# Patient Record
Sex: FEMALE | Race: WHITE | Employment: OTHER | URBAN - METROPOLITAN AREA
[De-identification: names, ages, dates, MRNs, and addresses within clinical notes are randomized per-mention and may not be internally consistent; named-entity substitution may affect disease eponyms.]

---

## 2018-02-20 ENCOUNTER — OFFICE VISIT (OUTPATIENT)
Dept: INFECTIOUS DISEASES | Age: 73
End: 2018-02-20
Payer: MEDICARE

## 2018-02-20 VITALS
TEMPERATURE: 97.5 F | HEIGHT: 64 IN | HEART RATE: 67 BPM | DIASTOLIC BLOOD PRESSURE: 79 MMHG | WEIGHT: 199 LBS | SYSTOLIC BLOOD PRESSURE: 153 MMHG | RESPIRATION RATE: 14 BRPM | OXYGEN SATURATION: 98 % | BODY MASS INDEX: 33.97 KG/M2

## 2018-02-20 DIAGNOSIS — M31.9: Primary | ICD-10-CM

## 2018-02-20 DIAGNOSIS — L02.93 RECURRENT BOILS: ICD-10-CM

## 2018-02-20 PROCEDURE — 99213 OFFICE O/P EST LOW 20 MIN: CPT | Performed by: INTERNAL MEDICINE

## 2018-02-20 PROCEDURE — G8599 NO ASA/ANTIPLAT THER USE RNG: HCPCS | Performed by: INTERNAL MEDICINE

## 2018-02-20 PROCEDURE — 4040F PNEUMOC VAC/ADMIN/RCVD: CPT | Performed by: INTERNAL MEDICINE

## 2018-02-20 PROCEDURE — 3017F COLORECTAL CA SCREEN DOC REV: CPT | Performed by: INTERNAL MEDICINE

## 2018-02-20 PROCEDURE — 3014F SCREEN MAMMO DOC REV: CPT | Performed by: INTERNAL MEDICINE

## 2018-02-20 PROCEDURE — 1090F PRES/ABSN URINE INCON ASSESS: CPT | Performed by: INTERNAL MEDICINE

## 2018-02-20 PROCEDURE — G8417 CALC BMI ABV UP PARAM F/U: HCPCS | Performed by: INTERNAL MEDICINE

## 2018-02-20 PROCEDURE — G8427 DOCREV CUR MEDS BY ELIG CLIN: HCPCS | Performed by: INTERNAL MEDICINE

## 2018-02-20 PROCEDURE — G8400 PT W/DXA NO RESULTS DOC: HCPCS | Performed by: INTERNAL MEDICINE

## 2018-02-20 PROCEDURE — 1123F ACP DISCUSS/DSCN MKR DOCD: CPT | Performed by: INTERNAL MEDICINE

## 2018-02-20 PROCEDURE — G8484 FLU IMMUNIZE NO ADMIN: HCPCS | Performed by: INTERNAL MEDICINE

## 2018-02-20 PROCEDURE — 1036F TOBACCO NON-USER: CPT | Performed by: INTERNAL MEDICINE

## 2018-02-20 RX ORDER — IBUPROFEN 800 MG/1
800 TABLET ORAL EVERY 6 HOURS PRN
COMMUNITY

## 2018-02-20 ASSESSMENT — ENCOUNTER SYMPTOMS
DIARRHEA: 0
BACK PAIN: 0
NAUSEA: 0
SHORTNESS OF BREATH: 0
ABDOMINAL PAIN: 0
ABDOMINAL DISTENTION: 0
VOICE CHANGE: 0
TROUBLE SWALLOWING: 0
FACIAL SWELLING: 0
EYE DISCHARGE: 0
COUGH: 0
SORE THROAT: 0

## 2018-02-20 NOTE — PROGRESS NOTES
 Potassium 11/11/2016 4.4  3.7 - 5.3 mmol/L Final    Chloride 11/11/2016 101  98 - 107 mmol/L Final    CO2 11/11/2016 25  20 - 31 mmol/L Final    Anion Gap 11/11/2016 14  9 - 17 mmol/L Final    Alkaline Phosphatase 11/11/2016 113* 35 - 104 U/L Final    ALT 11/11/2016 67* 5 - 33 U/L Final    AST 11/11/2016 63* <32 U/L Final    Total Bilirubin 11/11/2016 1.17  0.3 - 1.2 mg/dL Final    Total Protein 11/11/2016 6.8  6.4 - 8.3 g/dL Final    Alb 11/11/2016 4.1  3.5 - 5.2 g/dL Final    Albumin/Globulin Ratio 11/11/2016 1.5  1.0 - 2.5 Final    GFR Non- 11/11/2016 >60  >60 mL/min Final    GFR  11/11/2016 >60  >60 mL/min Final    GFR Comment 11/11/2016        Final    Comment: Average GFR for 79or more years old:   76 mL/min/1.73sq m  Chronic Kidney Disease:   <60 mL/min/1.73sq m  Kidney failure:   <15 mL/min/1.73sq m        Performed at [address] [place] [address] [phone]      GFR Staging 11/11/2016 NOT REPORTED   Final    Cholesterol 11/11/2016 141  <200 mg/dL Final    Comment:    Cholesterol Guidelines:      <200  Desirable   200-240  Borderline      >240  Undesirable         HDL 11/11/2016 51  >40 mg/dL Final    Comment:    HDL Guidelines:    <40     Undesirable   40-59    Borderline    >59     Desirable         LDL Cholesterol 11/11/2016 74  0 - 130 mg/dL Final    Comment:    LDL Guidelines:     <100    Desirable   100-129   Near to/above Desirable   130-159   Borderline      >159   Undesirable     Direct (measured) LDL and calculated LDL are not interchangeable tests.  Chol/HDL Ratio 11/11/2016 2.8  <5 Final    Triglycerides 11/11/2016 81  <150 mg/dL Final    Comment:    Triglyceride Guidelines:     <150   Desirable   150-199  Borderline   200-499  High     >499   Very high   Based on AHA Guidelines for fasting triglyceride, October 2012.      Performed at [facility] [address] [facility], [address] [place] [address] [phone]      VLDL

## 2018-02-20 NOTE — LETTER
left labial wound           Data Review:      No visits with results within 2 Month(s) from this visit.    Latest known visit with results is:   Hospital Outpatient Visit on 11/11/2016   Component Date Value Ref Range Status    Glucose 11/11/2016 186* 70 - 99 mg/dL Final    BUN 11/11/2016 19  8 - 23 mg/dL Final    CREATININE 11/11/2016 0.73  0.50 - 0.90 mg/dL Final    Bun/Cre Ratio 11/11/2016 NOT REPORTED  9 - 20 Final    Calcium 11/11/2016 9.1  8.6 - 10.4 mg/dL Final    Sodium 11/11/2016 140  135 - 144 mmol/L Final    Potassium 11/11/2016 4.4  3.7 - 5.3 mmol/L Final    Chloride 11/11/2016 101  98 - 107 mmol/L Final    CO2 11/11/2016 25  20 - 31 mmol/L Final    Anion Gap 11/11/2016 14  9 - 17 mmol/L Final    Alkaline Phosphatase 11/11/2016 113* 35 - 104 U/L Final    ALT 11/11/2016 67* 5 - 33 U/L Final    AST 11/11/2016 63* <32 U/L Final    Total Bilirubin 11/11/2016 1.17  0.3 - 1.2 mg/dL Final    Total Protein 11/11/2016 6.8  6.4 - 8.3 g/dL Final    Alb 11/11/2016 4.1  3.5 - 5.2 g/dL Final    Albumin/Globulin Ratio 11/11/2016 1.5  1.0 - 2.5 Final    GFR Non- 11/11/2016 >60  >60 mL/min Final    GFR  11/11/2016 >60  >60 mL/min Final    GFR Comment 11/11/2016        Final    Comment: Average GFR for 79or more years old:   76 mL/min/1.73sq m  Chronic Kidney Disease:   <60 mL/min/1.73sq m  Kidney failure:   <15 mL/min/1.73sq m        Performed at [address], [address] [place] [address] [phone]      GFR Staging 11/11/2016 NOT REPORTED   Final    Cholesterol 11/11/2016 141  <200 mg/dL Final    Comment:    Cholesterol Guidelines:      <200  Desirable   200-240  Borderline      >240  Undesirable         HDL 11/11/2016 51  >40 mg/dL Final    Comment:    HDL Guidelines:    <40     Undesirable   40-59    Borderline    >59     Desirable         LDL Cholesterol 11/11/2016 74  0 - 130 mg/dL Final    Comment:    LDL Guidelines:     <100    Desirable 100-129   Near to/above Desirable   130-159   Borderline      >159   Undesirable     Direct (measured) LDL and calculated LDL are not interchangeable tests.  Chol/HDL Ratio 11/11/2016 2.8  <5 Final    Triglycerides 11/11/2016 81  <150 mg/dL Final    Comment:    Triglyceride Guidelines:     <150   Desirable   150-199  Borderline   200-499  High     >499   Very high   Based on AHA Guidelines for fasting triglyceride, October 2012. Performed at [facility], [address] [place] [address] [phone]      VLDL 11/11/2016 NOT REPORTED  1 - 30 mg/dL Final    Microalb, Ur 11/11/2016 <12  mg/L Final    Creatinine, Ur 11/11/2016 137.5  28.0 - 217.0 mg/dL Final    Microalb/Crt. Ratio 11/11/2016 9  <25 mcg/mg creat Final         MICRO:      IMAGING:        Problem List:  Patient Active Problem List   Diagnosis    Hyperlipidemia    Diabetes mellitus ([address]    Confusion caused by a drug ([address]    Intentional drug overdose ([address]    Coronary artery disease involving native coronary artery of native heart without angina pectoris    HTN (hypertension), benign    Environmental allergies    Suicidal deliberate poisoning ([address]    Severe recurrent major depression without psychotic features ([address]    Pressure ulcer of foot    Diabetes mellitus due to underlying condition with foot ulcer ([address]             Thank you. Anthony Fleming MD  2/20/2018  4:09 PM    Infectious Disease Medicine    If you have questions, please do not hesitate to call me. I look forward to following Marvin Mccall along with you.     Sincerely,    MD Anthony Wiseman MD

## 2018-10-11 LAB
AVERAGE GLUCOSE: NORMAL
HBA1C MFR BLD: 8.3 %

## 2018-11-22 ENCOUNTER — HOSPITAL ENCOUNTER (EMERGENCY)
Age: 73
Discharge: HOME | End: 2018-11-22
Attending: EMERGENCY MEDICINE
Payer: MEDICARE

## 2018-11-22 ENCOUNTER — APPOINTMENT (OUTPATIENT)
Dept: GENERAL RADIOLOGY | Age: 73
End: 2018-11-22
Payer: MEDICARE

## 2018-11-22 VITALS
WEIGHT: 209 LBS | BODY MASS INDEX: 35.68 KG/M2 | SYSTOLIC BLOOD PRESSURE: 162 MMHG | DIASTOLIC BLOOD PRESSURE: 68 MMHG | TEMPERATURE: 97.6 F | OXYGEN SATURATION: 97 % | HEART RATE: 50 BPM | HEIGHT: 64 IN | RESPIRATION RATE: 14 BRPM

## 2018-11-22 DIAGNOSIS — I10 ESSENTIAL HYPERTENSION: Primary | ICD-10-CM

## 2018-11-22 LAB
ABSOLUTE EOS #: 0.16 K/UL (ref 0–0.44)
ABSOLUTE IMMATURE GRANULOCYTE: <0.03 K/UL (ref 0–0.3)
ABSOLUTE LYMPH #: 1.81 K/UL (ref 1.1–3.7)
ABSOLUTE MONO #: 0.39 K/UL (ref 0.1–1.2)
ANION GAP SERPL CALCULATED.3IONS-SCNC: 8 MMOL/L (ref 9–17)
BASOPHILS # BLD: 1 % (ref 0–2)
BASOPHILS ABSOLUTE: 0.05 K/UL (ref 0–0.2)
BUN BLDV-MCNC: 19 MG/DL (ref 8–23)
BUN/CREAT BLD: ABNORMAL (ref 9–20)
CALCIUM SERPL-MCNC: 9.4 MG/DL (ref 8.6–10.4)
CHLORIDE BLD-SCNC: 103 MMOL/L (ref 98–107)
CO2: 24 MMOL/L (ref 20–31)
CREAT SERPL-MCNC: 0.6 MG/DL (ref 0.5–0.9)
DIFFERENTIAL TYPE: ABNORMAL
EKG ATRIAL RATE: 50 BPM
EKG P AXIS: 30 DEGREES
EKG P-R INTERVAL: 208 MS
EKG Q-T INTERVAL: 464 MS
EKG QRS DURATION: 94 MS
EKG QTC CALCULATION (BAZETT): 423 MS
EKG R AXIS: 10 DEGREES
EKG T AXIS: 51 DEGREES
EKG VENTRICULAR RATE: 50 BPM
EOSINOPHILS RELATIVE PERCENT: 3 % (ref 1–4)
GFR AFRICAN AMERICAN: >60 ML/MIN
GFR NON-AFRICAN AMERICAN: >60 ML/MIN
GFR SERPL CREATININE-BSD FRML MDRD: ABNORMAL ML/MIN/{1.73_M2}
GFR SERPL CREATININE-BSD FRML MDRD: ABNORMAL ML/MIN/{1.73_M2}
GLUCOSE BLD-MCNC: 201 MG/DL (ref 70–99)
HCT VFR BLD CALC: 36.7 % (ref 36.3–47.1)
HEMOGLOBIN: 11.2 G/DL (ref 11.9–15.1)
IMMATURE GRANULOCYTES: 0 %
LYMPHOCYTES # BLD: 31 % (ref 24–43)
MCH RBC QN AUTO: 23.4 PG (ref 25.2–33.5)
MCHC RBC AUTO-ENTMCNC: 30.5 G/DL (ref 28.4–34.8)
MCV RBC AUTO: 76.8 FL (ref 82.6–102.9)
MONOCYTES # BLD: 7 % (ref 3–12)
NRBC AUTOMATED: 0 PER 100 WBC
PDW BLD-RTO: 13.9 % (ref 11.8–14.4)
PLATELET # BLD: 206 K/UL (ref 138–453)
PLATELET ESTIMATE: ABNORMAL
PMV BLD AUTO: 10.8 FL (ref 8.1–13.5)
POC TROPONIN I: 0 NG/ML (ref 0–0.1)
POC TROPONIN I: 0 NG/ML (ref 0–0.1)
POC TROPONIN INTERP: NORMAL
POC TROPONIN INTERP: NORMAL
POTASSIUM SERPL-SCNC: 4.7 MMOL/L (ref 3.7–5.3)
RBC # BLD: 4.78 M/UL (ref 3.95–5.11)
RBC # BLD: ABNORMAL 10*6/UL
SEG NEUTROPHILS: 58 % (ref 36–65)
SEGMENTED NEUTROPHILS ABSOLUTE COUNT: 3.46 K/UL (ref 1.5–8.1)
SODIUM BLD-SCNC: 135 MMOL/L (ref 135–144)
WBC # BLD: 5.9 K/UL (ref 3.5–11.3)
WBC # BLD: ABNORMAL 10*3/UL

## 2018-11-22 PROCEDURE — 6370000000 HC RX 637 (ALT 250 FOR IP): Performed by: STUDENT IN AN ORGANIZED HEALTH CARE EDUCATION/TRAINING PROGRAM

## 2018-11-22 PROCEDURE — 80048 BASIC METABOLIC PNL TOTAL CA: CPT

## 2018-11-22 PROCEDURE — 99284 EMERGENCY DEPT VISIT MOD MDM: CPT

## 2018-11-22 PROCEDURE — 96374 THER/PROPH/DIAG INJ IV PUSH: CPT

## 2018-11-22 PROCEDURE — 93005 ELECTROCARDIOGRAM TRACING: CPT

## 2018-11-22 PROCEDURE — 84484 ASSAY OF TROPONIN QUANT: CPT

## 2018-11-22 PROCEDURE — 71046 X-RAY EXAM CHEST 2 VIEWS: CPT

## 2018-11-22 PROCEDURE — 85025 COMPLETE CBC W/AUTO DIFF WBC: CPT

## 2018-11-22 PROCEDURE — 6360000002 HC RX W HCPCS: Performed by: STUDENT IN AN ORGANIZED HEALTH CARE EDUCATION/TRAINING PROGRAM

## 2018-11-22 RX ORDER — ONDANSETRON 2 MG/ML
4 INJECTION INTRAMUSCULAR; INTRAVENOUS ONCE
Status: COMPLETED | OUTPATIENT
Start: 2018-11-22 | End: 2018-11-22

## 2018-11-22 RX ORDER — ASPIRIN 81 MG/1
324 TABLET, CHEWABLE ORAL ONCE
Status: COMPLETED | OUTPATIENT
Start: 2018-11-22 | End: 2018-11-22

## 2018-11-22 RX ADMIN — ASPIRIN 81 MG 324 MG: 81 TABLET ORAL at 09:27

## 2018-11-22 RX ADMIN — ONDANSETRON 4 MG: 2 INJECTION INTRAMUSCULAR; INTRAVENOUS at 09:27

## 2018-11-22 ASSESSMENT — HEART SCORE: ECG: 1

## 2018-11-22 ASSESSMENT — ENCOUNTER SYMPTOMS
COUGH: 0
CHEST TIGHTNESS: 0
BACK PAIN: 0
WHEEZING: 0
RHINORRHEA: 0
SHORTNESS OF BREATH: 0
EYE DISCHARGE: 0
ABDOMINAL DISTENTION: 0
VOMITING: 0
SORE THROAT: 0
PHOTOPHOBIA: 0
EYE PAIN: 0
NAUSEA: 1

## 2018-11-22 NOTE — ED NOTES
Pt to ER via LS 3 with c/o dizziness and nausea, pt was found to be hypertensive per report, pt's SBP > 200. Pt reports taking BP meds last night. Denies chest pain/SOB, headache, vomiting, fever/chills. Pt was given 2 SL Nitro PTA and 's. Pt arrives alert and oriented x 4, no acute distress noted, rr even and NL. Placed on full continuous monitor. Will continue to monitor.      Lucio Arauz RN  11/22/18 0623

## 2018-11-22 NOTE — ED NOTES
Bed: 15  Expected date:   Expected time:   Means of arrival:   Comments:  [address] [id number], [place], RN  11/22/18 9560

## 2018-11-22 NOTE — ED PROVIDER NOTES
[facility]     Emergency Department     Faculty Attestation    I performed a history and physical examination of the patient and discussed management with the resident. I have reviewed and agree with the residents findings including all diagnostic interpretations, and treatment plans as written at the time of my review. Any areas of disagreement are noted on the chart. I was personally present for the key portions of any procedures. I have documented in the chart those procedures where I was not present during the key portions. I agree with the chief complaint, past medical history, past surgical history, allergies, medications, social and family history as documented unless otherwise noted below. Documentation of the HPI, Physical Exam and Medical Decision Making performed by miladys is based on my personal performance of the HPI, PE and MDM. For Physician Assistant/ Nurse Practitioner cases/documentation I have personally evaluated this patient and have completed at least one if not all key elements of the E/M (history, physical exam, and MDM). Additional findings are as noted. Primary Care Physician: Flower Fuentes MD    History: This is a 68 y.o. female who presents to the Emergency Department with complaint of dizziness. The patient presents with her complaint of dizziness began this morning. Was worse with any type of movement. The last couple of minutes and has resolved. She denies any blurred vision or diplopia. She denies any numbness, tingling or weakness. Denies any chest pain or shortness of breath associated with this. Patient had noted that her blood pressure was elevated. Physical:   height is 5' 4\" (1.626 m) and weight is 209 lb (94.8 kg). Her oral temperature is 97.6 °F (36.4 °C). Her blood pressure is 161/82 (abnormal) and her pulse is 51. Her oxygen saturation is 100%.   Lungs are clear auscultation bilaterally, heart

## 2018-11-22 NOTE — ED PROVIDER NOTES
[facility] ED  eMERGENCY dEPARTMENT eNCOUnter  Resident    Pt Name: Prince Montilla  MRN: [id number]  [place] 1945  Date of evaluation: 11/22/2018  PCP:  Alfredo Lawton MD    CHIEF COMPLAINT       Chief Complaint   Patient presents with    Hypertension     hypertensive and dizziness. Per report SBP > 200. Pt reports taking BP meds last night. Denies chest pain/SOB, headache    Dizziness       HISTORY OF PRESENT ILLNESS     Prince Montilla is a 68 y.o. female past medical history diabetes hypertension hyperlipidemia prior MI ×2 without stents presents with sudden onset of nausea and chest discomfort with radiation to the left shoulder and associated dizziness. This started at rest when she was watching television all EMS EMS administered 0.8 mg of nitroglycerin which improved her blood pressure was previously in the 200s now in the 160s. No fever, chills, abdominal or back pain, lightheaded, dyspnea on exertion, emesis, diaphoresis, headache, slurred speech, ataxia, limb weakness, facial droop, hematuria. Denies tobacco, alcohol, drug use specifically cocaine. REVIEW OF SYSTEMS       Review of Systems   Constitutional: Negative for activity change and fever. HENT: Negative for congestion, ear discharge, rhinorrhea and sore throat. Eyes: Negative for photophobia, pain, discharge and visual disturbance. Respiratory: Negative for cough, chest tightness, shortness of breath and wheezing. Cardiovascular: Positive for chest pain. Gastrointestinal: Positive for nausea. Negative for abdominal distention and vomiting. Genitourinary: Negative for flank pain. Musculoskeletal: Negative for back pain, gait problem and neck pain. Skin: Negative for pallor and wound. PAST MEDICAL HISTORY      has a past medical history of Cellulitis; Diabetes mellitus ([address] Hyperlipidemia; and Myocardial infarct ([facility]     SURGICAL HISTORY        has a past surgical history that includes heard.  Bradycardia 51   Pulmonary/Chest: Effort normal and breath sounds normal. No respiratory distress. She has no wheezes. She has no rales. Abdominal: Soft. Bowel sounds are normal. She exhibits no distension. There is no tenderness. There is no rebound. Neurological: She is alert and oriented to person, place, and time. Normal Heel to [facility]. Normal Finger nose Finger. Normal Julienne. Appropriate giat, not wide based. CN II - XII intact. DTRs +2 bilateral Bicept, Triceps, Brachioradialias, Patellar, Achilies. Symmetric sensation to light and deep palpation bilaterally in upper and lower extremities. +5/5 strength to bilateral upper extremity flexion and extension as well as bilateral hip / leg / plantar / dorsi flexion of lower extremities. No focal or sensory motor deficits. No weakness upon cervical flexion to indicate critical spinal stenosis. Skin: No rash noted. No erythema. Nursing note and vitals reviewed. DIAGNOSTIC RESULTS     EKG: All EKG's are interpreted by the Emergency Department Physician whoeither signs or Co-signs this chart in the absence of a cardiologist.    Normal sinus rhythm normal axis ventricular rate of 50 no left atrial enlargement or hypertrophy use. Poor R-wave progression no ST changes. T-wave inversion in anterior leads V2 V3. Prolonged NE interval 208. Appropriate QRS QTC 90 4/4/20 respectively. Overall EKG is nondiagnostic    RADIOLOGY:   I directlyvisualized the following images and reviewed the radiologist interpretations:  XR CHEST STANDARD (2 VW)   Final Result   No focal airspace consolidation or pulmonary vascular congestion.                    LABS:  Labs Reviewed   BASIC METABOLIC PANEL - Abnormal; Notable for the following:        Result Value    Glucose 201 (*)     Anion Gap 8 (*)     All other components within normal limits   CBC WITH AUTO DIFFERENTIAL - Abnormal; Notable for the following:     Hemoglobin 11.2 (*)     MCV 76.8 (*)     MCH 23.4 DO  Resident  11/22/18 1149

## 2019-09-10 ENCOUNTER — ANESTHESIA (OUTPATIENT)
Dept: OPERATING ROOM | Age: 74
End: 2019-09-10
Payer: MEDICARE

## 2019-09-10 ENCOUNTER — HOSPITAL ENCOUNTER (OUTPATIENT)
Age: 74
Setting detail: SURGERY ADMIT
Discharge: HOME | End: 2019-09-10
Attending: SURGERY | Admitting: SURGERY
Payer: MEDICARE

## 2019-09-10 ENCOUNTER — ANESTHESIA EVENT (OUTPATIENT)
Dept: OPERATING ROOM | Age: 74
End: 2019-09-10
Payer: MEDICARE

## 2019-09-10 VITALS
SYSTOLIC BLOOD PRESSURE: 145 MMHG | RESPIRATION RATE: 15 BRPM | BODY MASS INDEX: 35 KG/M2 | TEMPERATURE: 97 F | HEART RATE: 64 BPM | HEIGHT: 64 IN | DIASTOLIC BLOOD PRESSURE: 57 MMHG | WEIGHT: 205 LBS | OXYGEN SATURATION: 95 %

## 2019-09-10 VITALS — SYSTOLIC BLOOD PRESSURE: 97 MMHG | DIASTOLIC BLOOD PRESSURE: 55 MMHG | TEMPERATURE: 97.3 F | OXYGEN SATURATION: 95 %

## 2019-09-10 DIAGNOSIS — L02.211 ABDOMINAL WALL ABSCESS: Primary | ICD-10-CM

## 2019-09-10 LAB
ABSOLUTE EOS #: 0.18 K/UL (ref 0–0.44)
ABSOLUTE IMMATURE GRANULOCYTE: 0.02 K/UL (ref 0–0.3)
ABSOLUTE LYMPH #: 1.55 K/UL (ref 1.1–3.7)
ABSOLUTE MONO #: 0.38 K/UL (ref 0.1–1.2)
ANION GAP SERPL CALCULATED.3IONS-SCNC: 10 MMOL/L (ref 9–17)
BASOPHILS # BLD: 1 % (ref 0–2)
BASOPHILS ABSOLUTE: 0.04 K/UL (ref 0–0.2)
BUN BLDV-MCNC: 24 MG/DL (ref 8–23)
BUN/CREAT BLD: 25 (ref 9–20)
CALCIUM SERPL-MCNC: 9.6 MG/DL (ref 8.6–10.4)
CHLORIDE BLD-SCNC: 103 MMOL/L (ref 98–107)
CO2: 25 MMOL/L (ref 20–31)
CREAT SERPL-MCNC: 0.96 MG/DL (ref 0.5–0.9)
DIFFERENTIAL TYPE: ABNORMAL
EOSINOPHILS RELATIVE PERCENT: 3 % (ref 1–4)
GFR AFRICAN AMERICAN: >60 ML/MIN
GFR NON-AFRICAN AMERICAN: 57 ML/MIN
GFR SERPL CREATININE-BSD FRML MDRD: ABNORMAL ML/MIN/{1.73_M2}
GFR SERPL CREATININE-BSD FRML MDRD: ABNORMAL ML/MIN/{1.73_M2}
GLUCOSE BLD-MCNC: 185 MG/DL (ref 65–105)
GLUCOSE BLD-MCNC: 194 MG/DL (ref 65–105)
GLUCOSE BLD-MCNC: 296 MG/DL (ref 70–99)
HCT VFR BLD CALC: 40.8 % (ref 36.3–47.1)
HEMOGLOBIN: 12.4 G/DL (ref 11.9–15.1)
IMMATURE GRANULOCYTES: 0 %
LYMPHOCYTES # BLD: 23 % (ref 24–43)
MCH RBC QN AUTO: 22.8 PG (ref 25.2–33.5)
MCHC RBC AUTO-ENTMCNC: 30.4 G/DL (ref 28.4–34.8)
MCV RBC AUTO: 74.9 FL (ref 82.6–102.9)
MONOCYTES # BLD: 6 % (ref 3–12)
NRBC AUTOMATED: 0 PER 100 WBC
PDW BLD-RTO: 14.2 % (ref 11.8–14.4)
PLATELET # BLD: 242 K/UL (ref 138–453)
PLATELET ESTIMATE: ABNORMAL
PMV BLD AUTO: 10 FL (ref 8.1–13.5)
POTASSIUM SERPL-SCNC: 4.6 MMOL/L (ref 3.7–5.3)
RBC # BLD: 5.45 M/UL (ref 3.95–5.11)
RBC # BLD: ABNORMAL 10*6/UL
SEG NEUTROPHILS: 67 % (ref 36–65)
SEGMENTED NEUTROPHILS ABSOLUTE COUNT: 4.64 K/UL (ref 1.5–8.1)
SODIUM BLD-SCNC: 138 MMOL/L (ref 135–144)
WBC # BLD: 6.8 K/UL (ref 3.5–11.3)
WBC # BLD: ABNORMAL 10*3/UL

## 2019-09-10 PROCEDURE — 7100000010 HC PHASE II RECOVERY - FIRST 15 MIN: Performed by: SURGERY

## 2019-09-10 PROCEDURE — 3700000000 HC ANESTHESIA ATTENDED CARE: Performed by: SURGERY

## 2019-09-10 PROCEDURE — 87075 CULTR BACTERIA EXCEPT BLOOD: CPT

## 2019-09-10 PROCEDURE — 6370000000 HC RX 637 (ALT 250 FOR IP): Performed by: NURSE ANESTHETIST, CERTIFIED REGISTERED

## 2019-09-10 PROCEDURE — 3600000002 HC SURGERY LEVEL 2 BASE: Performed by: SURGERY

## 2019-09-10 PROCEDURE — 87070 CULTURE OTHR SPECIMN AEROBIC: CPT

## 2019-09-10 PROCEDURE — 85025 COMPLETE CBC W/AUTO DIFF WBC: CPT

## 2019-09-10 PROCEDURE — 87186 SC STD MICRODIL/AGAR DIL: CPT

## 2019-09-10 PROCEDURE — 87205 SMEAR GRAM STAIN: CPT

## 2019-09-10 PROCEDURE — 80048 BASIC METABOLIC PNL TOTAL CA: CPT

## 2019-09-10 PROCEDURE — 87206 SMEAR FLUORESCENT/ACID STAI: CPT

## 2019-09-10 PROCEDURE — 2500000003 HC RX 250 WO HCPCS: Performed by: NURSE ANESTHETIST, CERTIFIED REGISTERED

## 2019-09-10 PROCEDURE — 6360000002 HC RX W HCPCS: Performed by: NURSE ANESTHETIST, CERTIFIED REGISTERED

## 2019-09-10 PROCEDURE — 7100000000 HC PACU RECOVERY - FIRST 15 MIN: Performed by: SURGERY

## 2019-09-10 PROCEDURE — 7100000011 HC PHASE II RECOVERY - ADDTL 15 MIN: Performed by: SURGERY

## 2019-09-10 PROCEDURE — 2580000003 HC RX 258: Performed by: ANESTHESIOLOGY

## 2019-09-10 PROCEDURE — 87102 FUNGUS ISOLATION CULTURE: CPT

## 2019-09-10 PROCEDURE — 3700000001 HC ADD 15 MINUTES (ANESTHESIA): Performed by: SURGERY

## 2019-09-10 PROCEDURE — 3600000012 HC SURGERY LEVEL 2 ADDTL 15MIN: Performed by: SURGERY

## 2019-09-10 PROCEDURE — 87077 CULTURE AEROBIC IDENTIFY: CPT

## 2019-09-10 PROCEDURE — 6370000000 HC RX 637 (ALT 250 FOR IP): Performed by: ANESTHESIOLOGY

## 2019-09-10 PROCEDURE — 86403 PARTICLE AGGLUT ANTBDY SCRN: CPT

## 2019-09-10 PROCEDURE — 7100000001 HC PACU RECOVERY - ADDTL 15 MIN: Performed by: SURGERY

## 2019-09-10 PROCEDURE — 2709999900 HC NON-CHARGEABLE SUPPLY: Performed by: SURGERY

## 2019-09-10 PROCEDURE — 82947 ASSAY GLUCOSE BLOOD QUANT: CPT

## 2019-09-10 RX ORDER — OXYCODONE HYDROCHLORIDE AND ACETAMINOPHEN 5; 325 MG/1; MG/1
1 TABLET ORAL PRN
Status: COMPLETED | OUTPATIENT
Start: 2019-09-10 | End: 2019-09-10

## 2019-09-10 RX ORDER — DEXAMETHASONE SODIUM PHOSPHATE 10 MG/ML
INJECTION INTRAMUSCULAR; INTRAVENOUS PRN
Status: DISCONTINUED | OUTPATIENT
Start: 2019-09-10 | End: 2019-09-10 | Stop reason: SDUPTHER

## 2019-09-10 RX ORDER — MONTELUKAST SODIUM 10 MG/1
10 TABLET ORAL NIGHTLY
COMMUNITY

## 2019-09-10 RX ORDER — INSULIN GLARGINE 100 [IU]/ML
22 INJECTION, SOLUTION SUBCUTANEOUS DAILY
COMMUNITY

## 2019-09-10 RX ORDER — HYDRALAZINE HYDROCHLORIDE 20 MG/ML
5 INJECTION INTRAMUSCULAR; INTRAVENOUS EVERY 10 MIN PRN
Status: DISCONTINUED | OUTPATIENT
Start: 2019-09-10 | End: 2019-09-10 | Stop reason: HOSPADM

## 2019-09-10 RX ORDER — PROPOFOL 10 MG/ML
INJECTION, EMULSION INTRAVENOUS PRN
Status: DISCONTINUED | OUTPATIENT
Start: 2019-09-10 | End: 2019-09-10 | Stop reason: SDUPTHER

## 2019-09-10 RX ORDER — PHENOL 1.4 %
1 AEROSOL, SPRAY (ML) MUCOUS MEMBRANE NIGHTLY
COMMUNITY

## 2019-09-10 RX ORDER — EPHEDRINE SULFATE/0.9% NACL/PF 50 MG/5 ML
SYRINGE (ML) INTRAVENOUS PRN
Status: DISCONTINUED | OUTPATIENT
Start: 2019-09-10 | End: 2019-09-10 | Stop reason: SDUPTHER

## 2019-09-10 RX ORDER — FENTANYL CITRATE 50 UG/ML
25 INJECTION, SOLUTION INTRAMUSCULAR; INTRAVENOUS EVERY 5 MIN PRN
Status: DISCONTINUED | OUTPATIENT
Start: 2019-09-10 | End: 2019-09-10 | Stop reason: HOSPADM

## 2019-09-10 RX ORDER — SOLIFENACIN SUCCINATE 5 MG/1
10 TABLET, FILM COATED ORAL DAILY
COMMUNITY

## 2019-09-10 RX ORDER — SODIUM CHLORIDE, SODIUM LACTATE, POTASSIUM CHLORIDE, CALCIUM CHLORIDE 600; 310; 30; 20 MG/100ML; MG/100ML; MG/100ML; MG/100ML
INJECTION, SOLUTION INTRAVENOUS CONTINUOUS
Status: DISCONTINUED | OUTPATIENT
Start: 2019-09-10 | End: 2019-09-10 | Stop reason: HOSPADM

## 2019-09-10 RX ORDER — MIDAZOLAM HYDROCHLORIDE 1 MG/ML
INJECTION INTRAMUSCULAR; INTRAVENOUS PRN
Status: DISCONTINUED | OUTPATIENT
Start: 2019-09-10 | End: 2019-09-10 | Stop reason: SDUPTHER

## 2019-09-10 RX ORDER — ONDANSETRON 2 MG/ML
INJECTION INTRAMUSCULAR; INTRAVENOUS PRN
Status: DISCONTINUED | OUTPATIENT
Start: 2019-09-10 | End: 2019-09-10 | Stop reason: SDUPTHER

## 2019-09-10 RX ORDER — INSULIN LISPRO 100 [IU]/ML
30 INJECTION, SUSPENSION SUBCUTANEOUS NIGHTLY
COMMUNITY

## 2019-09-10 RX ORDER — OXYCODONE HYDROCHLORIDE AND ACETAMINOPHEN 5; 325 MG/1; MG/1
2 TABLET ORAL PRN
Status: COMPLETED | OUTPATIENT
Start: 2019-09-10 | End: 2019-09-10

## 2019-09-10 RX ORDER — HYDROMORPHONE HCL 110MG/55ML
0.5 PATIENT CONTROLLED ANALGESIA SYRINGE INTRAVENOUS EVERY 5 MIN PRN
Status: DISCONTINUED | OUTPATIENT
Start: 2019-09-10 | End: 2019-09-10 | Stop reason: HOSPADM

## 2019-09-10 RX ORDER — SENNOSIDES 8.6 MG
650 CAPSULE ORAL EVERY 8 HOURS PRN
COMMUNITY

## 2019-09-10 RX ORDER — LABETALOL HYDROCHLORIDE 5 MG/ML
5 INJECTION, SOLUTION INTRAVENOUS EVERY 10 MIN PRN
Status: DISCONTINUED | OUTPATIENT
Start: 2019-09-10 | End: 2019-09-10 | Stop reason: HOSPADM

## 2019-09-10 RX ORDER — HYDROCODONE BITARTRATE AND ACETAMINOPHEN 5; 325 MG/1; MG/1
1 TABLET ORAL EVERY 6 HOURS PRN
Qty: 28 TABLET | Refills: 0 | Status: SHIPPED | OUTPATIENT
Start: 2019-09-10 | End: 2019-09-17

## 2019-09-10 RX ORDER — ONDANSETRON 2 MG/ML
4 INJECTION INTRAMUSCULAR; INTRAVENOUS
Status: DISCONTINUED | OUTPATIENT
Start: 2019-09-10 | End: 2019-09-10 | Stop reason: HOSPADM

## 2019-09-10 RX ORDER — LIDOCAINE HYDROCHLORIDE 20 MG/ML
INJECTION, SOLUTION EPIDURAL; INFILTRATION; INTRACAUDAL; PERINEURAL PRN
Status: DISCONTINUED | OUTPATIENT
Start: 2019-09-10 | End: 2019-09-10 | Stop reason: SDUPTHER

## 2019-09-10 RX ORDER — PROMETHAZINE HYDROCHLORIDE 25 MG/ML
6.25 INJECTION, SOLUTION INTRAMUSCULAR; INTRAVENOUS
Status: DISCONTINUED | OUTPATIENT
Start: 2019-09-10 | End: 2019-09-10 | Stop reason: HOSPADM

## 2019-09-10 RX ORDER — FENTANYL CITRATE 50 UG/ML
INJECTION, SOLUTION INTRAMUSCULAR; INTRAVENOUS PRN
Status: DISCONTINUED | OUTPATIENT
Start: 2019-09-10 | End: 2019-09-10 | Stop reason: SDUPTHER

## 2019-09-10 RX ORDER — MULTIVITAMIN
1 TABLET ORAL DAILY
COMMUNITY

## 2019-09-10 RX ADMIN — Medication 100 MCG: at 13:38

## 2019-09-10 RX ADMIN — SODIUM CHLORIDE, POTASSIUM CHLORIDE, SODIUM LACTATE AND CALCIUM CHLORIDE: 600; 310; 30; 20 INJECTION, SOLUTION INTRAVENOUS at 12:18

## 2019-09-10 RX ADMIN — SODIUM CHLORIDE, POTASSIUM CHLORIDE, SODIUM LACTATE AND CALCIUM CHLORIDE: 600; 310; 30; 20 INJECTION, SOLUTION INTRAVENOUS at 13:33

## 2019-09-10 RX ADMIN — INSULIN LISPRO 6 UNITS: 100 INJECTION, SOLUTION INTRAVENOUS; SUBCUTANEOUS at 14:01

## 2019-09-10 RX ADMIN — PROPOFOL 200 MG: 10 INJECTION, EMULSION INTRAVENOUS at 13:38

## 2019-09-10 RX ADMIN — DEXAMETHASONE SODIUM PHOSPHATE 5 MG: 10 INJECTION INTRAMUSCULAR; INTRAVENOUS at 13:51

## 2019-09-10 RX ADMIN — Medication 10 MG: at 13:46

## 2019-09-10 RX ADMIN — Medication 10 MG: at 13:50

## 2019-09-10 RX ADMIN — MIDAZOLAM 1 MG: 1 INJECTION INTRAMUSCULAR; INTRAVENOUS at 13:35

## 2019-09-10 RX ADMIN — OXYCODONE AND ACETAMINOPHEN 1 TABLET: 5; 325 TABLET ORAL at 15:52

## 2019-09-10 RX ADMIN — LIDOCAINE HYDROCHLORIDE 100 MG: 20 INJECTION, SOLUTION EPIDURAL; INFILTRATION; INTRACAUDAL; PERINEURAL at 13:38

## 2019-09-10 RX ADMIN — ONDANSETRON 4 MG: 2 INJECTION, SOLUTION INTRAMUSCULAR; INTRAVENOUS at 14:02

## 2019-09-10 ASSESSMENT — PULMONARY FUNCTION TESTS
PIF_VALUE: 2
PIF_VALUE: 11
PIF_VALUE: 19
PIF_VALUE: 17
PIF_VALUE: 1
PIF_VALUE: 3
PIF_VALUE: 10
PIF_VALUE: 1
PIF_VALUE: 2
PIF_VALUE: 20
PIF_VALUE: 17
PIF_VALUE: 18
PIF_VALUE: 17
PIF_VALUE: 21
PIF_VALUE: 11
PIF_VALUE: 17
PIF_VALUE: 18
PIF_VALUE: 19
PIF_VALUE: 18
PIF_VALUE: 16
PIF_VALUE: 18
PIF_VALUE: 11
PIF_VALUE: 1
PIF_VALUE: 17
PIF_VALUE: 0
PIF_VALUE: 19
PIF_VALUE: 17
PIF_VALUE: 1
PIF_VALUE: 18
PIF_VALUE: 17
PIF_VALUE: 1
PIF_VALUE: 3
PIF_VALUE: 1

## 2019-09-10 ASSESSMENT — PAIN DESCRIPTION - ORIENTATION: ORIENTATION: LEFT;LOWER

## 2019-09-10 ASSESSMENT — PAIN - FUNCTIONAL ASSESSMENT: PAIN_FUNCTIONAL_ASSESSMENT: 0-10

## 2019-09-10 ASSESSMENT — PAIN SCALES - GENERAL
PAINLEVEL_OUTOF10: 4
PAINLEVEL_OUTOF10: 0
PAINLEVEL_OUTOF10: 4
PAINLEVEL_OUTOF10: 0

## 2019-09-10 ASSESSMENT — PAIN DESCRIPTION - DESCRIPTORS
DESCRIPTORS: SORE
DESCRIPTORS: SORE

## 2019-09-10 ASSESSMENT — PAIN DESCRIPTION - LOCATION: LOCATION: ABDOMEN

## 2019-09-10 NOTE — H&P
time with normal affect  Head:  normocephalic, atraumatic, face symmetrical .  Eye: no icterus, redness, pupils equal and reactive, extraocular eye movements intact, conjunctiva clear  Ear: normal external ear, no discharge, hearing intact  Nose:  no drainage noted  Mouth: mucous membranes moist Dentition good  Neck: supple, no carotid bruits, thyroid not palpable  Lungs: Bilateral equal air entry, clear to ausculation, no wheezing, rales or rhonchi, normal effort  Cardiovascular: Heart sounds distant, normal rate, regular rhythm, no murmur, gallop, rub. Abdomen: Soft,tenderness LLQ over abdominal dressing, nondistended, normal bowel sounds, no hepatomegaly or splenomegaly  Neurologic: There are no new focal motor or sensory deficits, normal muscle tone and bulk, no abnormal sensation, normal speech, cranial nerves II through XII grossly intact   Skin: Lesion LLQ abdomen with soiled dressing Lesion right thigh/groin covered with large bandaid  Refer to Dr Kelly Ho notes. No gross lesions, rashes, bruising or bleeding on exposed skin area  Extremities:  peripheral pulses palpable, no pedal edema or calf pain with palpation  Psych: normal affect     Investigations:      Laboratory Testing:  Recent Results (from the past 24 hour(s))   CBC Auto Differential    Collection Time: 09/10/19 12:10 PM   Result Value Ref Range    WBC 6.8 3.5 - 11.3 k/uL    RBC 5.45 (H) 3.95 - 5.11 m/uL    Hemoglobin 12.4 11.9 - 15.1 g/dL    Hematocrit 40.8 36.3 - 47.1 %    MCV 74.9 (L) 82.6 - 102.9 fL    MCH 22.8 (L) 25.2 - 33.5 pg    MCHC 30.4 28.4 - 34.8 g/dL    RDW 14.2 11.8 - 14.4 %    Platelets 836 809 - 606 k/uL    MPV 10.0 8.1 - 13.5 fL    NRBC Automated 0.0 0.0 per 100 WBC    Differential Type NOT REPORTED     Seg Neutrophils 67 (H) 36 - 65 %    Lymphocytes 23 (L) 24 - 43 %    Monocytes 6 3 - 12 %    Eosinophils % 3 1 - 4 %    Basophils 1 0 - 2 %    Immature Granulocytes 0 0 %    Segs Absolute 4.64 1.50 - 8.10 k/uL    Absolute Lymph # 1. 55 1.10 - 3.70 k/uL    Absolute Mono # 0.38 0.10 - 1.20 k/uL    Absolute Eos # 0.18 0.00 - 0.44 k/uL    Basophils Absolute 0.04 0.00 - 0.20 k/uL    Absolute Immature Granulocyte 0.02 0.00 - 0.30 k/uL    WBC Morphology NOT REPORTED     RBC Morphology MICROCYTOSIS PRESENT     Platelet Estimate NOT REPORTED    Basic Metabolic Panel    Collection Time: 09/10/19 12:10 PM   Result Value Ref Range    Glucose 296 (H) 70 - 99 mg/dL    BUN 24 (H) 8 - 23 mg/dL    CREATININE 0.96 (H) 0.50 - 0.90 mg/dL    Bun/Cre Ratio 25 (H) 9 - 20    Calcium 9.6 8.6 - 10.4 mg/dL    Sodium 138 135 - 144 mmol/L    Potassium 4.6 3.7 - 5.3 mmol/L    Chloride 103 98 - 107 mmol/L    CO2 25 20 - 31 mmol/L    Anion Gap 10 9 - 17 mmol/L    GFR Non-African American 57 (L) >60 mL/min    GFR African American >60 >60 mL/min    GFR Comment          GFR Staging NOT REPORTED    POC Glucose Fingerstick    Collection Time: 09/10/19 12:11 PM   Result Value Ref Range    POC Glucose 185 (H) 65 - 105 mg/dL       Recent Labs     09/10/19  1210   HGB 12.4   HCT 40.8   WBC 6.8   MCV 74.9*      K 4.6      CO2 25   BUN 24*   CREATININE 0.96*   GLUCOSE 296*       Imaging/Diagnostics:      Diagnosis:      1.  Left abdominal wall abscess and right upper thigh abscess    Plans:     1. I&D abdominal wall abscess with debridement, right thigh abscess       MEHDI Chapman CNP  9/10/2019  12:39 PM

## 2019-09-10 NOTE — DISCHARGE INSTR - DIET
 Good nutrition is important when healing from an illness, injury, or surgery. Follow any nutrition recommendations given to you during your hospital stay.  If you were given an oral nutrition supplement while in the hospital, continue to take this supplement at home. You can take it with meals, in-between meals, and/or before bedtime. These supplements can be purchased at most local grocery stores, pharmacies, and chain super-stores.  If you have any questions about your diet or nutrition, call the hospital and ask for the dietitian.     Low-fat diabetic diet  Drink plenty of fluids  Take Senokot-S or Dulcolax as needed until you have a bowel movement

## 2019-09-11 LAB
DIRECT EXAM: NORMAL
Lab: NORMAL
SPECIMEN DESCRIPTION: NORMAL

## 2019-09-13 LAB
CULTURE: ABNORMAL
DIRECT EXAM: ABNORMAL
DIRECT EXAM: ABNORMAL
Lab: ABNORMAL
SPECIMEN DESCRIPTION: ABNORMAL

## 2019-10-14 LAB
CULTURE: NORMAL
Lab: NORMAL
SPECIMEN DESCRIPTION: NORMAL

## 2020-10-20 ENCOUNTER — HOSPITAL ENCOUNTER (OUTPATIENT)
Age: 75
Discharge: HOME | End: 2020-10-20
Payer: MEDICARE

## 2020-10-20 LAB
ALBUMIN SERPL-MCNC: 4.3 G/DL (ref 3.5–5.2)
ALBUMIN/GLOBULIN RATIO: 1.5 (ref 1–2.5)
ALP BLD-CCNC: 119 U/L (ref 35–104)
ALT SERPL-CCNC: 17 U/L (ref 5–33)
ANION GAP SERPL CALCULATED.3IONS-SCNC: 13 MMOL/L (ref 9–17)
AST SERPL-CCNC: 24 U/L
BILIRUB SERPL-MCNC: 1.02 MG/DL (ref 0.3–1.2)
BUN BLDV-MCNC: 21 MG/DL (ref 8–23)
BUN/CREAT BLD: ABNORMAL (ref 9–20)
CALCIUM SERPL-MCNC: 9.5 MG/DL (ref 8.6–10.4)
CHLORIDE BLD-SCNC: 102 MMOL/L (ref 98–107)
CHOLESTEROL, FASTING: 125 MG/DL
CHOLESTEROL/HDL RATIO: 3
CO2: 24 MMOL/L (ref 20–31)
CREAT SERPL-MCNC: 0.9 MG/DL (ref 0.5–0.9)
CREATININE URINE: 215.2 MG/DL (ref 28–217)
ESTIMATED AVERAGE GLUCOSE: 398 MG/DL
GFR AFRICAN AMERICAN: >60 ML/MIN
GFR NON-AFRICAN AMERICAN: >60 ML/MIN
GFR SERPL CREATININE-BSD FRML MDRD: ABNORMAL ML/MIN/{1.73_M2}
GFR SERPL CREATININE-BSD FRML MDRD: ABNORMAL ML/MIN/{1.73_M2}
GLUCOSE BLD-MCNC: 279 MG/DL (ref 70–99)
HBA1C MFR BLD: 15.5 % (ref 4–6)
HDLC SERPL-MCNC: 41 MG/DL
LDL CHOLESTEROL: 63 MG/DL (ref 0–130)
MICROALBUMIN/CREAT 24H UR: 95 MG/L
MICROALBUMIN/CREAT UR-RTO: 44 MCG/MG CREAT
POTASSIUM SERPL-SCNC: 4.6 MMOL/L (ref 3.7–5.3)
SODIUM BLD-SCNC: 139 MMOL/L (ref 135–144)
TOTAL PROTEIN: 7.2 G/DL (ref 6.4–8.3)
TRIGLYCERIDE, FASTING: 105 MG/DL
VLDLC SERPL CALC-MCNC: NORMAL MG/DL (ref 1–30)

## 2020-10-20 PROCEDURE — 80061 LIPID PANEL: CPT

## 2020-10-20 PROCEDURE — 82043 UR ALBUMIN QUANTITATIVE: CPT

## 2020-10-20 PROCEDURE — 36415 COLL VENOUS BLD VENIPUNCTURE: CPT

## 2020-10-20 PROCEDURE — 82570 ASSAY OF URINE CREATININE: CPT

## 2020-10-20 PROCEDURE — 80053 COMPREHEN METABOLIC PANEL: CPT

## 2020-10-20 PROCEDURE — 83036 HEMOGLOBIN GLYCOSYLATED A1C: CPT

## 2020-11-06 ENCOUNTER — HOSPITAL ENCOUNTER (OUTPATIENT)
Dept: MAMMOGRAPHY | Age: 75
Discharge: HOME | End: 2020-11-08
Payer: MEDICARE

## 2020-11-06 PROCEDURE — 77080 DXA BONE DENSITY AXIAL: CPT

## 2021-07-02 ENCOUNTER — HOSPITAL ENCOUNTER (OUTPATIENT)
Age: 76
Discharge: HOME | End: 2021-07-02
Payer: MEDICARE

## 2021-07-02 LAB
ESTIMATED AVERAGE GLUCOSE: 226 MG/DL
HBA1C MFR BLD: 9.5 % (ref 4–6)

## 2021-07-02 PROCEDURE — 83036 HEMOGLOBIN GLYCOSYLATED A1C: CPT

## 2021-07-02 PROCEDURE — 36415 COLL VENOUS BLD VENIPUNCTURE: CPT

## 2022-08-16 ENCOUNTER — HOSPITAL ENCOUNTER (OUTPATIENT)
Age: 77
Setting detail: SPECIMEN
Discharge: HOME | End: 2022-08-16

## 2022-08-16 LAB
ALBUMIN SERPL-MCNC: 4.3 G/DL (ref 3.5–5.2)
ALBUMIN/GLOBULIN RATIO: 1.6 (ref 1–2.5)
ALP BLD-CCNC: 97 U/L (ref 35–104)
ALT SERPL-CCNC: 12 U/L (ref 5–33)
ANION GAP SERPL CALCULATED.3IONS-SCNC: 13 MMOL/L (ref 9–17)
AST SERPL-CCNC: 17 U/L
BILIRUB SERPL-MCNC: 1.39 MG/DL (ref 0.3–1.2)
BILIRUBIN URINE: NEGATIVE
BUN BLDV-MCNC: 16 MG/DL (ref 8–23)
CALCIUM SERPL-MCNC: 9.4 MG/DL (ref 8.6–10.4)
CASTS UA: NORMAL /LPF (ref 0–2)
CASTS UA: NORMAL /LPF (ref 0–2)
CHLORIDE BLD-SCNC: 103 MMOL/L (ref 98–107)
CHOLESTEROL/HDL RATIO: 4.9
CHOLESTEROL: 175 MG/DL
CO2: 23 MMOL/L (ref 20–31)
COLOR: ABNORMAL
CREAT SERPL-MCNC: 0.99 MG/DL (ref 0.5–0.9)
CREATININE URINE: 371.6 MG/DL (ref 28–217)
EPITHELIAL CELLS UA: NORMAL /HPF (ref 0–5)
ESTIMATED AVERAGE GLUCOSE: 349 MG/DL
GFR AFRICAN AMERICAN: >60 ML/MIN
GFR NON-AFRICAN AMERICAN: 55 ML/MIN
GFR SERPL CREATININE-BSD FRML MDRD: ABNORMAL ML/MIN/{1.73_M2}
GLUCOSE BLD-MCNC: 296 MG/DL (ref 70–99)
GLUCOSE URINE: ABNORMAL
HBA1C MFR BLD: 13.8 % (ref 4–6)
HDLC SERPL-MCNC: 36 MG/DL
KETONES, URINE: ABNORMAL
LDL CHOLESTEROL: 87 MG/DL (ref 0–130)
LEUKOCYTE ESTERASE, URINE: ABNORMAL
MICROALBUMIN/CREAT 24H UR: 443 MG/L
MICROALBUMIN/CREAT UR-RTO: 119 MCG/MG CREAT
NITRITE, URINE: NEGATIVE
PH UA: 5 (ref 5–8)
POTASSIUM SERPL-SCNC: 4.4 MMOL/L (ref 3.7–5.3)
PROTEIN UA: ABNORMAL
RBC UA: NORMAL /HPF (ref 0–2)
SODIUM BLD-SCNC: 139 MMOL/L (ref 135–144)
SPECIFIC GRAVITY UA: 1.04 (ref 1–1.03)
TOTAL PROTEIN: 7 G/DL (ref 6.4–8.3)
TRIGL SERPL-MCNC: 260 MG/DL
TSH SERPL DL<=0.05 MIU/L-ACNC: 1.51 UIU/ML (ref 0.3–5)
TURBIDITY: ABNORMAL
URINE HGB: NEGATIVE
UROBILINOGEN, URINE: NORMAL
WBC UA: NORMAL /HPF (ref 0–5)

## 2022-08-18 LAB
CULTURE: ABNORMAL
SPECIMEN DESCRIPTION: ABNORMAL

## 2022-08-31 ENCOUNTER — HOSPITAL ENCOUNTER (EMERGENCY)
Age: 77
Discharge: HOME | End: 2022-08-31
Attending: EMERGENCY MEDICINE
Payer: MEDICARE

## 2022-08-31 VITALS
RESPIRATION RATE: 18 BRPM | SYSTOLIC BLOOD PRESSURE: 120 MMHG | BODY MASS INDEX: 34.33 KG/M2 | DIASTOLIC BLOOD PRESSURE: 66 MMHG | WEIGHT: 200 LBS | TEMPERATURE: 97.7 F | OXYGEN SATURATION: 99 % | HEART RATE: 80 BPM

## 2022-08-31 DIAGNOSIS — R45.851 SUICIDAL IDEATION: Primary | ICD-10-CM

## 2022-08-31 LAB
ABSOLUTE EOS #: 0.16 K/UL (ref 0–0.44)
ABSOLUTE IMMATURE GRANULOCYTE: <0.03 K/UL (ref 0–0.3)
ABSOLUTE LYMPH #: 1.39 K/UL (ref 1.1–3.7)
ABSOLUTE MONO #: 0.36 K/UL (ref 0.1–1.2)
ACETAMINOPHEN LEVEL: <5 UG/ML (ref 10–30)
ALBUMIN SERPL-MCNC: 4 G/DL (ref 3.5–5.2)
ALBUMIN/GLOBULIN RATIO: 1.4 (ref 1–2.5)
ALP BLD-CCNC: 82 U/L (ref 35–104)
ALT SERPL-CCNC: 10 U/L (ref 5–33)
AMPHETAMINE SCREEN URINE: NEGATIVE
ANION GAP SERPL CALCULATED.3IONS-SCNC: 11 MMOL/L (ref 9–17)
AST SERPL-CCNC: 16 U/L
BARBITURATE SCREEN URINE: NEGATIVE
BASOPHILS # BLD: 0 % (ref 0–2)
BASOPHILS ABSOLUTE: <0.03 K/UL (ref 0–0.2)
BENZODIAZEPINE SCREEN, URINE: NEGATIVE
BILIRUB SERPL-MCNC: 1.02 MG/DL (ref 0.3–1.2)
BILIRUBIN URINE: NEGATIVE
BUN BLDV-MCNC: 18 MG/DL (ref 8–23)
CALCIUM SERPL-MCNC: 9.3 MG/DL (ref 8.6–10.4)
CANNABINOID SCREEN URINE: NEGATIVE
CASTS UA: NORMAL /LPF (ref 0–8)
CHLORIDE BLD-SCNC: 99 MMOL/L (ref 98–107)
CHP ED QC CHECK: NORMAL
CO2: 25 MMOL/L (ref 20–31)
COCAINE METABOLITE, URINE: NEGATIVE
COLOR: YELLOW
CREAT SERPL-MCNC: 0.92 MG/DL (ref 0.5–0.9)
EOSINOPHILS RELATIVE PERCENT: 2 % (ref 1–4)
EPITHELIAL CELLS UA: NORMAL /HPF (ref 0–5)
ETHANOL PERCENT: <0.01 %
ETHANOL: <10 MG/DL
FENTANYL URINE: NEGATIVE
GFR AFRICAN AMERICAN: >60 ML/MIN
GFR NON-AFRICAN AMERICAN: 59 ML/MIN
GFR SERPL CREATININE-BSD FRML MDRD: ABNORMAL ML/MIN/{1.73_M2}
GLUCOSE BLD-MCNC: 253 MG/DL
GLUCOSE BLD-MCNC: 253 MG/DL (ref 65–105)
GLUCOSE BLD-MCNC: 255 MG/DL (ref 65–105)
GLUCOSE BLD-MCNC: 267 MG/DL (ref 70–99)
GLUCOSE URINE: ABNORMAL
HCT VFR BLD CALC: 34.8 % (ref 36.3–47.1)
HEMOGLOBIN: 10.7 G/DL (ref 11.9–15.1)
IMMATURE GRANULOCYTES: 0 %
KETONES, URINE: ABNORMAL
LEUKOCYTE ESTERASE, URINE: ABNORMAL
LYMPHOCYTES # BLD: 20 % (ref 24–43)
MCH RBC QN AUTO: 22.9 PG (ref 25.2–33.5)
MCHC RBC AUTO-ENTMCNC: 30.7 G/DL (ref 28.4–34.8)
MCV RBC AUTO: 74.5 FL (ref 82.6–102.9)
METHADONE SCREEN, URINE: NEGATIVE
MONOCYTES # BLD: 5 % (ref 3–12)
NITRITE, URINE: NEGATIVE
NRBC AUTOMATED: 0 PER 100 WBC
OPIATES, URINE: NEGATIVE
OXYCODONE SCREEN URINE: NEGATIVE
PDW BLD-RTO: 14.5 % (ref 11.8–14.4)
PH UA: 5.5 (ref 5–8)
PHENCYCLIDINE, URINE: NEGATIVE
PLATELET # BLD: 206 K/UL (ref 138–453)
PMV BLD AUTO: 9.8 FL (ref 8.1–13.5)
POTASSIUM SERPL-SCNC: 3.7 MMOL/L (ref 3.7–5.3)
PROTEIN UA: ABNORMAL
RBC # BLD: 4.67 M/UL (ref 3.95–5.11)
RBC # BLD: ABNORMAL 10*6/UL
RBC UA: NORMAL /HPF (ref 0–4)
SALICYLATE LEVEL: <1 MG/DL (ref 3–10)
SARS-COV-2, RAPID: NOT DETECTED
SEG NEUTROPHILS: 73 % (ref 36–65)
SEGMENTED NEUTROPHILS ABSOLUTE COUNT: 4.92 K/UL (ref 1.5–8.1)
SODIUM BLD-SCNC: 135 MMOL/L (ref 135–144)
SPECIFIC GRAVITY UA: 1.03 (ref 1–1.03)
SPECIMEN DESCRIPTION: NORMAL
TEST INFORMATION: NORMAL
TOTAL PROTEIN: 6.9 G/DL (ref 6.4–8.3)
TOXIC TRICYCLIC SC,BLOOD: NEGATIVE
TURBIDITY: ABNORMAL
URINE HGB: ABNORMAL
UROBILINOGEN, URINE: NORMAL
WBC # BLD: 6.9 K/UL (ref 3.5–11.3)
WBC UA: NORMAL /HPF (ref 0–5)

## 2022-08-31 PROCEDURE — 82947 ASSAY GLUCOSE BLOOD QUANT: CPT

## 2022-08-31 PROCEDURE — 87635 SARS-COV-2 COVID-19 AMP PRB: CPT

## 2022-08-31 PROCEDURE — 80179 DRUG ASSAY SALICYLATE: CPT

## 2022-08-31 PROCEDURE — 80143 DRUG ASSAY ACETAMINOPHEN: CPT

## 2022-08-31 PROCEDURE — 81001 URINALYSIS AUTO W/SCOPE: CPT

## 2022-08-31 PROCEDURE — 80053 COMPREHEN METABOLIC PANEL: CPT

## 2022-08-31 PROCEDURE — 99285 EMERGENCY DEPT VISIT HI MDM: CPT

## 2022-08-31 PROCEDURE — G0480 DRUG TEST DEF 1-7 CLASSES: HCPCS

## 2022-08-31 PROCEDURE — 6370000000 HC RX 637 (ALT 250 FOR IP)

## 2022-08-31 PROCEDURE — 80307 DRUG TEST PRSMV CHEM ANLYZR: CPT

## 2022-08-31 PROCEDURE — 96372 THER/PROPH/DIAG INJ SC/IM: CPT

## 2022-08-31 PROCEDURE — 85025 COMPLETE CBC W/AUTO DIFF WBC: CPT

## 2022-08-31 PROCEDURE — 93005 ELECTROCARDIOGRAM TRACING: CPT

## 2022-08-31 RX ORDER — INSULIN LISPRO 100 [IU]/ML
0-4 INJECTION, SOLUTION INTRAVENOUS; SUBCUTANEOUS
Status: DISCONTINUED | OUTPATIENT
Start: 2022-08-31 | End: 2022-08-31 | Stop reason: HOSPADM

## 2022-08-31 RX ORDER — INSULIN LISPRO 100 [IU]/ML
0-4 INJECTION, SOLUTION INTRAVENOUS; SUBCUTANEOUS NIGHTLY
Status: DISCONTINUED | OUTPATIENT
Start: 2022-08-31 | End: 2022-08-31 | Stop reason: HOSPADM

## 2022-08-31 RX ORDER — CEPHALEXIN 500 MG/1
500 CAPSULE ORAL 2 TIMES DAILY
Qty: 14 CAPSULE | Refills: 0 | Status: SHIPPED | OUTPATIENT
Start: 2022-08-31 | End: 2022-09-07

## 2022-08-31 RX ADMIN — INSULIN LISPRO 2 UNITS: 100 INJECTION, SOLUTION INTRAVENOUS; SUBCUTANEOUS at 17:33

## 2022-08-31 ASSESSMENT — PAIN SCALES - GENERAL: PAINLEVEL_OUTOF10: 2

## 2022-08-31 ASSESSMENT — PAIN DESCRIPTION - LOCATION: LOCATION: SHOULDER

## 2022-08-31 ASSESSMENT — ENCOUNTER SYMPTOMS
COUGH: 0
ABDOMINAL PAIN: 0
SHORTNESS OF BREATH: 0

## 2022-08-31 ASSESSMENT — PAIN - FUNCTIONAL ASSESSMENT: PAIN_FUNCTIONAL_ASSESSMENT: 0-10

## 2022-08-31 NOTE — ED NOTES
Patient arrives to the ER by TPSARAH who has completed a Pink Slip on patient. [employer] states that patient's daughter called them because her mother locked herself in the bathroom with a knife and would not open the door. The [employer] officer stated it took them 20-minutes to get the knife away from patient. Patient admits to a history of depression and suicide attempts by overdose. Patient states she is not taking any meds for depression or seeing a counselor/psychiatrist currently. Patient tells SW that she \"doesn't want to be here, I just want to die, but I probably wouldn't hurt myself\". SW explained the 72-hour hold on a Pink and that she will need to be evaluated by a psychiatrist and patient voices understanding. She states she has been to [address]  several times previously. Patient also given the option of a referral to [facility] and patient agreeable. NATALYA spoke with [facility], and referral completed. NATALYA will fax requested documentation as well. Await reply regarding admission. Leeroy Fiore.  [address] [place], [place] [address] [place] [address]  08/31/22 3673

## 2022-08-31 NOTE — DISCHARGE INSTRUCTIONS
You have been seen in the ER today for suicidal ideation. While you were here, we did the following:  Comprehensive lab work  Social work consults  Upon discharge, you have received the following:  Placement in a care facility    If you begin to experience any symptoms such as chest pain shortness of breath nausea vomiting dizziness drowsiness abdominal pain loss of consciousness or any other symptoms you find concerning please return to the ED for follow-up evaluation. If you have been given medication please take them as prescribed. Do not take more medication than recommended at any given time. Finish all antibiotic    Please follow-up with your primary care provider within 5 to 7 days for continued care. Please feel free return to the hospital if your symptoms worsen or any new concerning symptoms develop. Follow-up with your primary care physician as needed for all other the concerns.

## 2022-08-31 NOTE — ED NOTES
[] Elian    [] [address]    [x]  One [facility] ASSESSMENT      Y  N     [x] [] In the past two weeks have you had thoughts of hurting yourself in any way? [x] [] In the past two weeks have you had thoughts that you would be better off dead? [] [x] Have you made a suicide attempt in the past two months? [] [x] Do you have a plan for hurting yourself or suicide? [] [x] Presence of hallucinations/voices related to hurting himself or herself or someone else. SUICIDE/SECURITY WATCH PRECAUTION CHECKLIST     Orders    [x]  Suicide/Security Watch Precautions initiated as checked below:   8/31/22 4:44 PM EDT 26/26    [x] Notified physician:  Humberto Easton MD  8/31/22 4:44 PM EDT    [x] Orders obtained as appropriate:     [x] 1:1 Observer     [] Psych Consult     [] Psych Consult    Name:  Date:  Time:    [x] 1:1 Observer, Notified by:  Hayden David RN    Contact Nurse Supervisor    [x] Remove all personal clothes from room and place in snap/paper gown/pants. Slipper only    [x] Remove all personal belongings from room and secured away from patient. Documentation    [x] Initiate Suicide/Security Watch Precaution Flow Sheet    [x] Initiate individualized Care Plan/Problem    [x] Document why precautions initiated on flow sheet (Initiate Nursing Care Plan/Problem)    [x] 1:1 Observer in place; instructions provided. Suicide precautions require observer be within arms length. [x] Nurse-Observer Communication Hand-off initiated by RN, reviewed with Observer. Subsequently used as Hand Off between Observers. [x] Initiate every 15 minute observations per observer as delegated by the RN.     [x] Initiate RN assessment and documentation    Environmental Scan  Search Criteria and Process: OPTIONAL, see Search Policy    [x] Reason for search: SI    [x] Nursing in presence of second person to search patient    [x] Patient notified of reason for body assessment and belongings search:     Persons present during search: tech and security   Results of search and disposition: n/a       Searchers Name: tech and secirity    These items or items similar should be removed from the room:   [x] Chairs   [x] Telephone   [x] Trash cans and liners   [x] Plastic utensils (order Patient Safety tray)   [x] Empty or remove Sharps containers   [x] All personal clothing/belongings removed   [x] All unnecessary lead wires, electrical cords, draw cords, etc.   [x] Flowers and plants   [x] Double check for lighters, matches, razors, any glass items etc that can be used as weapons. Person completing Checklist: Mark Kendall RN       GENERAL INFORMATION     Y  N     [x] [] Has the patient been informed that they are on a watch and what that means? [x] [] Can the patient get out of Bed without nursing assistance? [] [x] Can the patient use the restroom without nursing assistance? [] [x] Can the patient walk the halls to [place] their legs? \"   [] [x] Does the patient have metal utensils? [x] [] Have the patient's belongings been placed out of control of the patient? [x] [] Have the patient and his/her belongings been checked for contraband? [x] [] Is the patient under any visitor restrictions? If Yes, explain: SI   [] [x] Is the patient under an alias? [facility] Name:   Authorized visitors (no more than two are to be on the list)   Name/Relationship:   Name/Relationship:    Name of Staff member that you  Received this information from?: n/a    General Description:    Justice Patton 28/32 female 68 y.o. Admission weight:      Race: [x]  [] Black  []   []   [] [place] [] Other  Facial Hair:  [] Yes  [x] No  If yes, please describe: Identifying Marks (i.e. Visible tattoos, scars, etc... ):     NURSING CARE PLAN    Nursing Diagnosis: Risk of Self Directed Harm  [] Actual  [x] Potential  Date Started: 8/31/22      Etiological Factors: (related to)  [] Expressed or implied suicidal ideation/behavior  [x] Depression  [] Suicide attempt      [] Low self-esteem  [] Hallucinations      [] Feeling of Hopelessness  [] Substance abuse or withdrawal    [] Dysfunctional family  [] Major traumatic event, eg., divorce, etc   [] Excessive stress/anxiety    8/31/22    Expected Outcomes    Patient will:   [x] Patient will remain safe for the duration of their stay   [x] Patient's environment will be safe, eg. Free of potential suicide weapons   [] Verbalize Recovery from suicidal episode and improvement in self-worth   [x] Discuss feeling that precipitated suicide attempt/thoughts/behavior   [] Will describe available resources for crisis prevention and management   [] Will verbalize positive coping skills     Nursing Intervention   [x] Assessment and Observations hourly   [x] Suicide Precautions implemented with patient, should be 1:1 observation   [x] Document observation [id number] and RN assessment hourly   [] Consult physician for:    [] Psychiatric consult    [] Pharmacological therapy    [] Other:    [x] Patient search completed by security   [x] Initiated appropriate safety protocols by removing from the patient's environment anything that could be used to inflict self injury, eg. Order safe tray, snap gown, etc   [x] Maintain open, warm, caring, non-judgmental attitude/manner towards patient   [] Discuss advantages and disadvantages of existing coping methods/skills   [x] Assist and educate patient with identifying present strengths and coping skills   [x] Keep patient informed regarding plan of care and provide clear concise explanations. Provide the patient/family education information as well as telephone numbers and other information about crisis centers, hot lines, and counselors.     Discharge Planning:   [] Referral  [] Groups [] Health agencies  [] Other:            Darrelyn Fleischer, RN  08/31/22 2350

## 2022-08-31 NOTE — ED NOTES
The following labs were labeled with appropriate pt sticker and tubed to lab:     [] Blue     [] Lavender   [] on ice  [] Green/yellow  [] Green/black [] on ice  [] Noretta Beagle  [] on ice  [] Yellow  [] Red  [] Pink  [] ABG  [] VBG    [] COVID-19 swab    [] Rapid  [] PCR  [] Flu swab  [] Peds Viral Panel     [x] Urine Sample  [] Pelvic Cultures  [] Blood Cultures  [] X 2  [] STREP Cultures         Genaro Nunse RN  08/31/22 0929

## 2022-08-31 NOTE — ED NOTES
Pt to ED via EMS with c/o suicidal ideation. Per ems pt locked her self in the bathroom when her daughter came over, states she had a knife in the bathroom, pt denies holding the knife to herself. Pt states she has been feeling depressed lately, states she has not been taking her meds daily d/t feeling like she \"was not having episodes. \" Denies plan. Pt denies auditory/visual halluc ation. Denies HI. States she has psych hx. Pt is a/o, ambulatory, RR even and non labored. Pt changed out. Belongings given to security.         Luiz Robb RN  08/31/22 4508

## 2022-08-31 NOTE — ED PROVIDER NOTES
[facility] ED  Emergency Department Encounter  Emergency Medicine Resident     Pt Name: Karen Cornejo  MRN: [id number]  [place] 1945  Date of evaluation: 8/31/22  PCP:  Karlos Payne MD    [address]       Chief Complaint   Patient presents with    Suicidal       HISTORY OFPRESENT ILLNESS  (Location/Symptom, Timing/Onset, Context/Setting, Quality, Duration, Modifying Factors,Severity.)      Karen Cornejo is a 68 y.o. female who presents with suicidal ideation. She was brought in by [facility] police after she allegedly held a sheathed knife up to her chest and stated that she no longer wanted to be alive. She states that while she is not actively suicidal, and does not have a plan of action, she does state that she would be content not waking up. She states the reason for these feelings are that she is bored, has no social contacts at home, is unable to drive, feels unsafe in her neighbourhood (but safe at home), and felt overwhelmed recently with the diagnosis of a UTI. PAST MEDICAL / SURGICAL / SOCIAL / FAMILY HISTORY      has a past medical history of Anxiety, Cellulitis, Depression, Diabetes mellitus ([facility] [address] History of blood transfusion, Hyperlipidemia, Hypertension, and Myocardial infarct ([facility] [address] has a past surgical history that includes Cholecystectomy; Hysterectomy; Tonsillectomy; knee surgery (Left); Appendectomy; Vulva surgery (12/2017); Colonoscopy; Endoscopy, colon, diagnostic; other surgical history (Left, 09/10/2019); and Abdomen surgery (Left, 9/10/2019). Social:  reports that she has quit smoking. She has never used smokeless tobacco. She reports current alcohol use. She reports that she does not use drugs. Family Hx: History reviewed. No pertinent family history.      Allergies:  Doxycycline, Farxiga [dapagliflozin], Levofloxacin, Mobic [meloxicam], Morphine, and Sulfa antibiotics    Home Medications:  Prior to Admission medications    Medication Sig Start Date End Date Taking? Authorizing Provider   insulin lispro protamine & lispro (HUMALOG MIX) (75-25) 100 UNIT per ML SUSP injection vial Inject 25 Units into the skin daily    Historical Provider, MD   insulin lispro protamine & lispro (HUMALOG MIX 75/25 KWIKPEN) (75-25) 100 UNIT per ML SUPN injection pen Inject 30 Units into the skin nightly    Historical Provider, MD   solifenacin (VESICARE) 5 MG tablet Take 10 mg by mouth daily    Historical Provider, MD   insulin glargine (LANTUS) 100 UNIT/ML injection vial Inject 22 Units into the skin daily     Historical Provider, MD   Multiple Vitamin (MULTI-DAY VITAMINS) TABS Take 1 tablet by mouth daily    Historical Provider, MD   Melatonin 10 MG TABS Take 1 tablet by mouth nightly    Historical Provider, MD   montelukast (SINGULAIR) 10 MG tablet Take 10 mg by mouth nightly    Historical Provider, MD   acetaminophen (ARTHRITIS PAIN RELIEF) 650 MG extended release tablet Take 650 mg by mouth every 8 hours as needed for Pain    Historical Provider, MD   sertraline (ZOLOFT) 50 MG tablet Take 50 mg by mouth daily    Historical Provider, MD   ibuprofen (ADVIL;MOTRIN) 800 MG tablet Take 800 mg by mouth every 6 hours as needed for Pain    Historical Provider, MD   magnesium chloride (MAG DELAY) 535 (64 Mg) MG TBCR extended release tablet Take 64 mg by mouth daily    Historical Provider, MD   Insulin Aspart (NOVOLOG SC) Inject into the skin    Historical Provider, MD   FLUoxetine (PROZAC) 40 MG capsule Take 1 capsule by mouth daily 11/24/15   Clau Gao MD   aspirin (ASCRIPTIN) 325 MG TABS Take 1 tablet by mouth daily 11/19/15   Kallie Rosales MD   lisinopril (PRINIVIL;ZESTRIL) 20 MG tablet Take 20 mg by mouth daily. Historical Provider, MD   carvedilol (COREG) 25 MG tablet Take 12.5 mg by mouth 2 times daily     Historical Provider, MD   gabapentin (NEURONTIN) 300 MG capsule Take 300 mg by mouth 3 times daily as needed.     Historical Provider, MD   atorvastatin (LIPITOR) 80 MG tablet Take 80 mg by mouth daily. Historical Provider, MD       REVIEW OFSYSTEMS    (2-9 systems for level 4, 10 or more for level 5)      Review of Systems   Constitutional:  Positive for activity change. Negative for fever. HENT:  Negative for sneezing. Respiratory:  Negative for cough and shortness of breath. Cardiovascular:  Negative for chest pain. Gastrointestinal:  Negative for abdominal pain. Genitourinary:  Positive for pelvic pain and urgency. Neurological:  Negative for speech difficulty, weakness and numbness. Psychiatric/Behavioral:  Positive for behavioral problems and suicidal ideas. The patient is nervous/anxious. PHYSICAL EXAM   (up to 7 for level 4, 8 or more forlevel 5)      INITIAL VITALS:   Vitals:    08/31/22 1915   BP: 120/66   Pulse: 80   Resp: 18   Temp: 97.7 °F (36.5 °C)   SpO2: 99%        Physical Exam  Psychiatric:         Attention and Perception: Attention and perception normal.         Mood and Affect: Mood is depressed (while she has a depressed mood, she is reactive). Speech: Speech normal. She is communicative. Speech is not rapid and pressured, delayed, slurred or tangential.         Behavior: Behavior normal. Behavior is cooperative. Thought Content: Thought content normal. Thought content does not include suicidal ideation. Thought content does not include homicidal or suicidal plan. Cognition and Memory: Cognition and memory normal.         Judgment: Judgment normal.       MEDICAL DECISION MAKING     Initial MDM/Plan: 68 y.o. female who presents with suicidal ideation. Brought in by police. After discussing with her options, social work was able to place her in a care home. She is agreeable to this.         Beth Coma Scale  Eye Opening: Spontaneous  Best Verbal Response: Oriented  Best Motor Response: Obeys commands  Beth Coma Scale Score: 15    DIAGNOSTIC RESULTS / EMERGENCYDEPARTMENT COURSE / MDM     LABS:  Labs Reviewed   URINALYSIS - Abnormal; Notable for the following components:       Result Value    Turbidity UA Cloudy (*)     Glucose, Ur 3+ (*)     Ketones, Urine TRACE (*)     Urine Hgb TRACE (*)     Protein, UA 2+ (*)     Leukocyte Esterase, Urine MODERATE (*)     All other components within normal limits   CBC WITH AUTO DIFFERENTIAL - Abnormal; Notable for the following components:    Hemoglobin 10.7 (*)     Hematocrit 34.8 (*)     MCV 74.5 (*)     MCH 22.9 (*)     RDW 14.5 (*)     Seg Neutrophils 73 (*)     Lymphocytes 20 (*)     All other components within normal limits   COMPREHENSIVE METABOLIC PANEL - Abnormal; Notable for the following components:    Glucose 267 (*)     Creatinine 0.92 (*)     GFR Non- 59 (*)     All other components within normal limits   TOX SCR, BLD, ED - Abnormal; Notable for the following components:    Acetaminophen Level <5 (*)     Salicylate Lvl <1 (*)     All other components within normal limits   POC GLUCOSE FINGERSTICK - Abnormal; Notable for the following components:    POC Glucose 253 (*)     All other components within normal limits   POC GLUCOSE FINGERSTICK - Abnormal; Notable for the following components:    POC Glucose 255 (*)     All other components within normal limits   POCT GLUCOSE - Normal   COVID-19, RAPID   URINE DRUG SCREEN   MICROSCOPIC URINALYSIS         RADIOLOGY:  No results found. EKG      All EKG's are interpreted by the Emergency Department Physicianwho either signs or Co-signs this chart in the absence of a cardiologist.    EMERGENCY DEPARTMENT COURSE:  ED Course as of 08/31/22 1932   Wed Aug 31, 2022   1630 Spoke with officers about situation [MZ]   [id number] with patient, took history.  Patient states that they have not taken action either physically or chemically to harm self [MZ]   [address] with social work [MZ]   8055 CBC with Auto Differential(!):    WBC 6.9   RBC 4.67   Hemoglobin Quant 10.7(!)   Hematocrit 34.8(!)   MCV 74.5(!) MCH 22.9(!)   MCHC 30.7   RDW 14.5(!)   Platelet Count 777   MPV 9.8   NRBC Automated 0.0   Seg Neutrophils 73(!)   Lymphocytes 20(!)   Monocytes 5   Eosinophils % 2   Basophils 0   Immature Granulocytes 0   Segs Absolute 4.92   Absolute Lymph # 1.39   Absolute Mono # 0.36   Absolute Eos # 0.16   Basophils Absolute <0.03   Absolute Immature Granulocyte <0.03   RBC Morphology ANISOCYTOSIS PRESENT  CBC with diff showed microcytic anemia [MZ]   1800 POC Glucose(!): 253  Glucose elevated, sliding scale should correct [MZ]   1800 Sodium: 135  Creatinine slightly above normal with slightly reduced GFR. Will correct with fuids [MZ]   1816 TOX SCR, BLD, ED(!):    Acetaminophen Level <5(!)   ETHANOL,ETHA <10   Ethanol percent <8.767   Salicylate Lvl <1(!)   Toxic Tricyclic Sc,Blood NEGATIVE  Tox screen is negative   [MZ]   1907 Spoke with social work, will dc to care facility in [facility] that can monitor and rehabilitate [MZ]      ED Course User Index  [MZ] Mary Alicia MD          PROCEDURES:  None    CONSULTS:  None      FINAL IMPRESSION      1.  Suicidal ideation          DISPOSITION / PLAN     DISPOSITION Discharge - Pending Orders Complete 08/31/2022 07:31:25 PM      PATIENT REFERRED TO:  Mae Shipman MD  [facility] [address] [phone]    In 1 week  As needed    DISCHARGE MEDICATIONS:  New Prescriptions    No medications on file       Mary Alicia MD  Emergency Medicine Resident    (Please note that portions of this note were completed with a voice recognition program.Efforts were made to edit the dictations but occasionally words are mis-transcribed.)      Mary Alicia MD  Resident  08/31/22 3539

## 2022-08-31 NOTE — ED NOTES
The following labs were labeled with appropriate pt sticker and tubed to lab:     [x] Blue     [x] Lavender   [] on ice  [x] Green/yellow  [x] Green/black [] on ice  [] Lovenia   [] on ice  [] Yellow  [] Red  [] Pink  [] ABG  [] VBG    [] COVID-19 swab    [] Rapid  [] PCR  [] Flu swab  [] Peds Viral Panel     [] Urine Sample  [] Pelvic Cultures  [] Blood Cultures  [] X 2  [] STREP Cultures         Suzanne Yepez RN  08/31/22 2490

## 2022-08-31 NOTE — ED NOTES
Per Stevan Shen at [facility] patient has been accepted. They will need a Covid test prior to setting up transport, Resident notified. Await test results to get transfer time. Patient and family updated. Roscoe Reina.  Dulce Maria Dejesus, [place]  08/31/22 8363

## 2022-08-31 NOTE — ED NOTES
The following labs were labeled with appropriate pt sticker and tubed to lab:     [] Blue     [] Lavender   [] on ice  [] Green/yellow  [] Green/black [] on ice  [] Heaven Chris  [] on ice  [] Yellow  [] Red  [] Pink  [] ABG  [] VBG    [x] COVID-19 swab    [x] Rapid  [] PCR  [] Flu swab  [] Peds Viral Panel     [] Urine Sample  [] Pelvic Cultures  [] Blood Cultures  [] X 2  [] STREP Cultures         Radha Cabral RN  08/31/22 3851

## 2022-08-31 NOTE — ED PROVIDER NOTES
[facility]  Emergency Department  Faculty Attestation     I performed a history and physical examination of the patient and discussed management with the resident. I reviewed the residents note and agree with the documented findings and plan of care. Any areas of disagreement are noted on the chart. I was personally present for the key portions of any procedures. I have documented in the chart those procedures where I was not present during the key portions. I have reviewed the emergency nurses triage note. I agree with the chief complaint, past medical history, past surgical history, allergies, medications, social and family history as documented unless otherwise noted below. For Physician Assistant/ Nurse Practitioner cases/documentation I have personally evaluated this patient and have completed at least one if not all key elements of the E/M (history, physical exam, and MDM). Additional findings are as noted. Primary Care Physician:  Mattie Monge MD    Screenings:  [unfilled]    CHIEF COMPLAINT       Chief Complaint   Patient presents with    Suicidal       RECENT VITALS:   Temp: 97.7 °F (36.5 °C),  Heart Rate: 78, Resp: 18, BP: (!) 172/84    LABS:  Labs Reviewed   POC GLUCOSE FINGERSTICK - Abnormal; Notable for the following components:       Result Value    POC Glucose 253 (*)     All other components within normal limits   URINALYSIS   URINE DRUG SCREEN   CBC WITH AUTO DIFFERENTIAL   COMPREHENSIVE METABOLIC PANEL   ETHANOL   TOX SCR, BLD, ED   POCT GLUCOSE       Radiology  No orders to display       CRITICAL CARE: There was a high probability of clinically significant/life threatening deterioration in this patient's condition which required my urgent intervention. Total critical care time was none minutes. This excludes any time for separately reportable procedures.      EKG:  EKG Interpretation    Interpreted by me    Rhythm: normal sinus   Rate: normal  Axis: Left  Ectopy: none  Conduction: normal  ST Segments: no acute change  T Waves: Inversion aVL  Q Waves: none    Clinical Impression: Nonspecific T inversion, no other acute changes    Attending Physician Additional  Notes  Patient suffered from depression anxiety most of her life. She has loss of interest in activities, dysphoric mood, insomnia, crying spells. She stopped taking all of her medications about a week ago. She has increase in thirst and increase in urine output. Decreased energy. She has had prior suicide attempt by overdose. She denies overdose at this time. Did not use drugs or alcohol. She was found by police to have a sheath knife on her body but she states that she sleeps with this because of living in a bad neighborhood. No prior violent attempts. On exam she is hypertensive afebrile nontoxic vital signs normal.  She is tearful. Mouth appears dry. No respiratory stress. Normal pupils extraocular moods cranial nerves and motor strength. Impression is depression, noncompliance with medications, diabetes, possible dehydration. Plan is laboratory studies, social work consultation. John Hogan.  Capri Rodgers MD, [facility]  Attending Emergency  Physician                Jessica Billings MD  08/31/22 5719

## 2022-09-01 LAB
EKG ATRIAL RATE: 74 BPM
EKG P AXIS: 30 DEGREES
EKG P-R INTERVAL: 194 MS
EKG Q-T INTERVAL: 406 MS
EKG QRS DURATION: 92 MS
EKG QTC CALCULATION (BAZETT): 450 MS
EKG R AXIS: -44 DEGREES
EKG T AXIS: 66 DEGREES
EKG VENTRICULAR RATE: 74 BPM

## 2022-09-01 NOTE — ED NOTES
NATALYA scheduled transport for pt via [facility].  ETA 9:15 pm.      FROYLAN Rosas  08/31/22 2033

## 2022-09-01 NOTE — ED NOTES
REPORT CALLED TO [facility] HEALTH NURSE, SUPERIOR TRANSPORT AT BEDSIDE, SECURITY CALLED FOR PROPERTY RELEASE     Virginia Salgado RN  08/31/22 0528

## 2023-02-17 ENCOUNTER — HOSPITAL ENCOUNTER (EMERGENCY)
Age: 78
Discharge: HOME | End: 2023-02-17
Attending: EMERGENCY MEDICINE
Payer: MEDICARE

## 2023-02-17 ENCOUNTER — APPOINTMENT (OUTPATIENT)
Dept: GENERAL RADIOLOGY | Age: 78
End: 2023-02-17
Payer: MEDICARE

## 2023-02-17 VITALS
TEMPERATURE: 98.7 F | BODY MASS INDEX: 33.84 KG/M2 | SYSTOLIC BLOOD PRESSURE: 188 MMHG | WEIGHT: 191 LBS | HEIGHT: 63 IN | HEART RATE: 69 BPM | RESPIRATION RATE: 12 BRPM | OXYGEN SATURATION: 93 % | DIASTOLIC BLOOD PRESSURE: 90 MMHG

## 2023-02-17 DIAGNOSIS — R55 SYNCOPE AND COLLAPSE: Primary | ICD-10-CM

## 2023-02-17 DIAGNOSIS — R42 DIZZINESS: ICD-10-CM

## 2023-02-17 LAB
ABSOLUTE EOS #: 0.16 K/UL (ref 0–0.44)
ABSOLUTE IMMATURE GRANULOCYTE: 0.05 K/UL (ref 0–0.3)
ABSOLUTE LYMPH #: 1.7 K/UL (ref 1.1–3.7)
ABSOLUTE MONO #: 0.43 K/UL (ref 0.1–1.2)
ALBUMIN SERPL-MCNC: 4.3 G/DL (ref 3.5–5.2)
ALBUMIN/GLOBULIN RATIO: 1.5 (ref 1–2.5)
ALP SERPL-CCNC: 98 U/L (ref 35–104)
ALT SERPL-CCNC: 11 U/L (ref 5–33)
ANION GAP SERPL CALCULATED.3IONS-SCNC: 13 MMOL/L (ref 9–17)
AST SERPL-CCNC: 17 U/L
BASOPHILS # BLD: 1 % (ref 0–2)
BASOPHILS ABSOLUTE: 0.05 K/UL (ref 0–0.2)
BILIRUB SERPL-MCNC: 0.7 MG/DL (ref 0.3–1.2)
BILIRUBIN URINE: NEGATIVE
BNP SERPL-MCNC: 296 PG/ML
BUN SERPL-MCNC: 19 MG/DL (ref 8–23)
CALCIUM SERPL-MCNC: 9.5 MG/DL (ref 8.6–10.4)
CARBOXYHEMOGLOBIN: 3.5 % (ref 0–5)
CASTS UA: NORMAL /LPF (ref 0–8)
CHLORIDE SERPL-SCNC: 99 MMOL/L (ref 98–107)
CO2 SERPL-SCNC: 23 MMOL/L (ref 20–31)
COLOR: YELLOW
CREAT SERPL-MCNC: 0.94 MG/DL (ref 0.5–0.9)
EOSINOPHILS RELATIVE PERCENT: 2 % (ref 1–4)
EPITHELIAL CELLS UA: NORMAL /HPF (ref 0–5)
FIO2: NORMAL
GFR SERPL CREATININE-BSD FRML MDRD: >60 ML/MIN/1.73M2
GLUCOSE SERPL-MCNC: 269 MG/DL (ref 70–99)
GLUCOSE UR STRIP.AUTO-MCNC: ABNORMAL MG/DL
HCO3 VENOUS: 24.8 MMOL/L (ref 24–30)
HCT VFR BLD AUTO: 36.9 % (ref 36.3–47.1)
HGB BLD-MCNC: 11.4 G/DL (ref 11.9–15.1)
IMMATURE GRANULOCYTES: 1 %
KETONES UR STRIP.AUTO-MCNC: NEGATIVE MG/DL
LEUKOCYTE ESTERASE UR QL STRIP.AUTO: ABNORMAL
LYMPHOCYTES # BLD: 21 % (ref 24–43)
MCH RBC QN AUTO: 23 PG (ref 25.2–33.5)
MCHC RBC AUTO-ENTMCNC: 30.9 G/DL (ref 28.4–34.8)
MCV RBC AUTO: 74.5 FL (ref 82.6–102.9)
MONOCYTES # BLD: 5 % (ref 3–12)
NITRITE UR QL STRIP.AUTO: NEGATIVE
NRBC AUTOMATED: 0 PER 100 WBC
O2 SAT, VEN: 81.1 % (ref 60–85)
PATIENT TEMP: 37
PCO2, VEN: 42.7 MM HG (ref 39–55)
PDW BLD-RTO: 14.2 % (ref 11.8–14.4)
PH VENOUS: 7.38 (ref 7.32–7.42)
PLATELET # BLD AUTO: 239 K/UL (ref 138–453)
PMV BLD AUTO: 10.3 FL (ref 8.1–13.5)
PO2, VEN: 45 MM HG (ref 30–50)
POSITIVE BASE EXCESS, VEN: 0.2 MMOL/L (ref 0–2)
POTASSIUM SERPL-SCNC: 4.3 MMOL/L (ref 3.7–5.3)
PROT SERPL-MCNC: 7.2 G/DL (ref 6.4–8.3)
PROT UR STRIP.AUTO-MCNC: 7 MG/DL (ref 5–8)
PROT UR STRIP.AUTO-MCNC: ABNORMAL MG/DL
RBC # BLD: 4.95 M/UL (ref 3.95–5.11)
RBC # BLD: ABNORMAL 10*6/UL
RBC CLUMPS #/AREA URNS AUTO: NORMAL /HPF (ref 0–4)
SEG NEUTROPHILS: 70 % (ref 36–65)
SEGMENTED NEUTROPHILS ABSOLUTE COUNT: 5.77 K/UL (ref 1.5–8.1)
SODIUM SERPL-SCNC: 135 MMOL/L (ref 135–144)
SPECIFIC GRAVITY UA: 1.02 (ref 1–1.03)
TROPONIN I SERPL DL<=0.01 NG/ML-MCNC: 12 NG/L (ref 0–14)
TROPONIN I SERPL DL<=0.01 NG/ML-MCNC: 12 NG/L (ref 0–14)
TURBIDITY: CLEAR
URINE HGB: ABNORMAL
UROBILINOGEN, URINE: NORMAL
WBC # BLD AUTO: 8.2 K/UL (ref 3.5–11.3)
WBC UA: NORMAL /HPF (ref 0–5)

## 2023-02-17 PROCEDURE — 6370000000 HC RX 637 (ALT 250 FOR IP): Performed by: HEALTH CARE PROVIDER

## 2023-02-17 PROCEDURE — 80053 COMPREHEN METABOLIC PANEL: CPT

## 2023-02-17 PROCEDURE — 84484 ASSAY OF TROPONIN QUANT: CPT

## 2023-02-17 PROCEDURE — 81001 URINALYSIS AUTO W/SCOPE: CPT

## 2023-02-17 PROCEDURE — 87086 URINE CULTURE/COLONY COUNT: CPT

## 2023-02-17 PROCEDURE — 36415 COLL VENOUS BLD VENIPUNCTURE: CPT

## 2023-02-17 PROCEDURE — 85025 COMPLETE CBC W/AUTO DIFF WBC: CPT

## 2023-02-17 PROCEDURE — 83880 ASSAY OF NATRIURETIC PEPTIDE: CPT

## 2023-02-17 PROCEDURE — 82805 BLOOD GASES W/O2 SATURATION: CPT

## 2023-02-17 PROCEDURE — 93005 ELECTROCARDIOGRAM TRACING: CPT | Performed by: HEALTH CARE PROVIDER

## 2023-02-17 PROCEDURE — 71046 X-RAY EXAM CHEST 2 VIEWS: CPT

## 2023-02-17 PROCEDURE — 99285 EMERGENCY DEPT VISIT HI MDM: CPT

## 2023-02-17 RX ORDER — ACETAMINOPHEN 500 MG
1000 TABLET ORAL ONCE
Status: COMPLETED | OUTPATIENT
Start: 2023-02-17 | End: 2023-02-17

## 2023-02-17 RX ADMIN — ACETAMINOPHEN 1000 MG: 500 TABLET ORAL at 21:34

## 2023-02-17 ASSESSMENT — ENCOUNTER SYMPTOMS
VOMITING: 0
ABDOMINAL PAIN: 0
NAUSEA: 0
BACK PAIN: 0
SHORTNESS OF BREATH: 0

## 2023-02-17 ASSESSMENT — PAIN DESCRIPTION - ORIENTATION: ORIENTATION: LEFT

## 2023-02-17 ASSESSMENT — PAIN - FUNCTIONAL ASSESSMENT: PAIN_FUNCTIONAL_ASSESSMENT: NONE - DENIES PAIN

## 2023-02-17 ASSESSMENT — PAIN SCALES - GENERAL: PAINLEVEL_OUTOF10: 7

## 2023-02-17 ASSESSMENT — PAIN DESCRIPTION - DESCRIPTORS: DESCRIPTORS: ACHING

## 2023-02-17 ASSESSMENT — PAIN DESCRIPTION - LOCATION: LOCATION: SHOULDER

## 2023-02-18 LAB
EKG ATRIAL RATE: 71 BPM
EKG P AXIS: 22 DEGREES
EKG P-R INTERVAL: 186 MS
EKG Q-T INTERVAL: 402 MS
EKG QRS DURATION: 94 MS
EKG QTC CALCULATION (BAZETT): 436 MS
EKG R AXIS: -32 DEGREES
EKG T AXIS: 68 DEGREES
EKG VENTRICULAR RATE: 71 BPM
MICROORGANISM SPEC CULT: NORMAL
SPECIMEN DESCRIPTION: NORMAL

## 2023-02-18 PROCEDURE — 93010 ELECTROCARDIOGRAM REPORT: CPT | Performed by: INTERNAL MEDICINE

## 2023-02-18 NOTE — DISCHARGE INSTRUCTIONS
You were seen in the emergency department for concerns of dizziness and possible syncope. There were no acute findings on your work-up, but based on your history and risks, we did offer you admission for further work-up, which you declined. We do recommend that you follow-up with your primary care provider as soon as possible. If you experience recurrent symptoms, chest pain, shortness of breath, lightheadedness, dizziness, weakness, or any other symptom you find concerning, please return to the emergency department immediately for reevaluation.

## 2023-02-18 NOTE — ED PROVIDER NOTES
[facility] ED  Emergency Department Encounter  Emergency Medicine Resident     Pt Name:Ana Domingo  MRN: [id number]  Birthdate 1945  Date of evaluation: 2/17/23  PCP:  Destiny Sauceda MD  Note Started: 7:40 PM EST      CHIEF COMPLAINT       Chief Complaint   Patient presents with    Chemical Exposure    Shaking    Dizziness       HISTORY OF PRESENT ILLNESS  (Location/Symptom, Timing/Onset, Context/Setting, Quality, Duration, Modifying Factors, Severity.)      Ana Domingo is a 77 y.o. female with history of coronary artery disease, depression and anxiety who presents with possible syncopal episode and complaints of weakness..  Patient states that she had a friend over at her house tonight, socializing and playing games.  Shortly after her friend left, patient states that she unexpectedly fell asleep and woke up feeling nauseous.  She did have 1 episode of emesis.  States she fell asleep again thereafter for approximately 1.5 hours.  Does not recall the event.  States that she called her friend, who stated that she left because she felt unwell.  Patient expresses concern for possible chemical exposure at home.  Denies any recent changes, or adjustments to her furnace.  It is powered by natural gas.  Denies any watercooled heating limits at home.  EMS did state that air was tested during their initial evaluation and was negative.    PAST MEDICAL / SURGICAL / SOCIAL / FAMILY HISTORY      has a past medical history of Anxiety, Cellulitis, Depression, Diabetes mellitus (HCC), History of blood transfusion, Hyperlipidemia, Hypertension, and Myocardial infarct (HCC).       has a past surgical history that includes Cholecystectomy; Hysterectomy; Tonsillectomy; knee surgery (Left); Appendectomy; Vulva surgery (12/2017); Colonoscopy; Endoscopy, colon, diagnostic; other surgical history (Left, 09/10/2019); and Abdomen surgery (Left, 9/10/2019).      Social History     Socioeconomic History     Marital status: Single     Spouse name: Not on file    Number of children: Not on file    Years of education: Not on file    Highest education level: Not on file   Occupational History    Not on file   Tobacco Use    Smoking status: Former    Smokeless tobacco: Never   Substance and Sexual Activity    Alcohol use: Yes     Comment: socially    Drug use: No    Sexual activity: Not on file   Other Topics Concern    Not on file   Social History Narrative    Not on file     Social Determinants of Health     Financial Resource Strain: Not on file   Food Insecurity: Not on file   Transportation Needs: Not on file   Physical Activity: Not on file   Stress: Not on file   Social Connections: Not on file   Intimate Partner Violence: Not on file   Housing Stability: Not on file       History reviewed. No pertinent family history. Allergies:  Doxycycline, Farxiga [dapagliflozin], Levofloxacin, Mobic [meloxicam], Morphine, and Sulfa antibiotics    Home Medications:  Prior to Admission medications    Medication Sig Start Date End Date Taking?  Authorizing Provider   insulin lispro protamine & lispro (HUMALOG MIX) (75-25) 100 UNIT per ML SUSP injection vial Inject 25 Units into the skin daily    Historical Provider, MD   insulin lispro protamine & lispro (HUMALOG MIX 75/25 KWIKPEN) (75-25) 100 UNIT per ML SUPN injection pen Inject 30 Units into the skin nightly    Historical Provider, MD   solifenacin (VESICARE) 5 MG tablet Take 10 mg by mouth daily    Historical Provider, MD   insulin glargine (LANTUS) 100 UNIT/ML injection vial Inject 22 Units into the skin daily     Historical Provider, MD   Multiple Vitamin (MULTI-DAY VITAMINS) TABS Take 1 tablet by mouth daily    Historical Provider, MD   Melatonin 10 MG TABS Take 1 tablet by mouth nightly    Historical Provider, MD   montelukast (SINGULAIR) 10 MG tablet Take 10 mg by mouth nightly    Historical Provider, MD   acetaminophen (ARTHRITIS PAIN RELIEF) 650 MG extended release tablet Take 650 mg by mouth every 8 hours as needed for Pain    Historical Provider, MD   sertraline (ZOLOFT) 50 MG tablet Take 50 mg by mouth daily    Historical Provider, MD   ibuprofen (ADVIL;MOTRIN) 800 MG tablet Take 800 mg by mouth every 6 hours as needed for Pain    Historical Provider, MD   magnesium chloride (MAG DELAY) 535 (64 Mg) MG TBCR extended release tablet Take 64 mg by mouth daily    Historical Provider, MD   Insulin Aspart (NOVOLOG SC) Inject into the skin    Historical Provider, MD   FLUoxetine (PROZAC) 40 MG capsule Take 1 capsule by mouth daily 11/24/15   Hanh Carson MD   aspirin (ASCRIPTIN) 325 MG TABS Take 1 tablet by mouth daily 11/19/15   Agustin Del Castillo MD   lisinopril (PRINIVIL;ZESTRIL) 20 MG tablet Take 20 mg by mouth daily. Historical Provider, MD   carvedilol (COREG) 25 MG tablet Take 12.5 mg by mouth 2 times daily     Historical Provider, MD   gabapentin (NEURONTIN) 300 MG capsule Take 300 mg by mouth 3 times daily as needed. Historical Provider, MD   atorvastatin (LIPITOR) 80 MG tablet Take 80 mg by mouth daily. Historical Provider, MD         REVIEW OF SYSTEMS       Review of Systems   Constitutional:  Negative for chills and fever. HENT:  Negative for tinnitus. Eyes:  Negative for visual disturbance. Respiratory:  Negative for shortness of breath. Cardiovascular:  Negative for chest pain and leg swelling. Gastrointestinal:  Negative for abdominal pain, nausea and vomiting. Genitourinary:  Negative for dysuria. Musculoskeletal:  Negative for back pain. Allergic/Immunologic: Negative for immunocompromised state. Neurological:  Positive for syncope and weakness. Negative for dizziness, light-headedness and headaches. Hematological:  Does not bruise/bleed easily.      PHYSICAL EXAM      INITIAL VITALS:   BP (!) 188/90   Pulse 69   Temp 98.7 °F (37.1 °C)   Resp 12   Ht 5' 3\" (1.6 m)   Wt 191 lb (86.6 kg)   SpO2 93%   BMI 33.83 kg/m²     Physical Exam  Vitals reviewed. Constitutional:       Appearance: Normal appearance. HENT:      Head: Normocephalic and atraumatic. Right Ear: External ear normal.      Left Ear: External ear normal.      Nose: Nose normal. No congestion. Mouth/Throat:      Mouth: Mucous membranes are moist.      Pharynx: Oropharynx is clear. Eyes:      Conjunctiva/sclera: Conjunctivae normal.      Pupils: Pupils are equal, round, and reactive to light. Cardiovascular:      Rate and Rhythm: Regular rhythm. Tachycardia present. Pulses: Normal pulses. Heart sounds: Normal heart sounds. Pulmonary:      Effort: Pulmonary effort is normal.      Breath sounds: Normal breath sounds. Chest:      Chest wall: No tenderness. Abdominal:      General: There is no distension. Palpations: Abdomen is soft. Tenderness: There is no abdominal tenderness. Musculoskeletal:      Cervical back: Normal range of motion and neck supple. Right lower leg: No edema. Left lower leg: No edema. Skin:     General: Skin is warm and dry. Capillary Refill: Capillary refill takes less than 2 seconds. Neurological:      General: No focal deficit present. Mental Status: She is alert and oriented to person, place, and time. Cranial Nerves: No cranial nerve deficit. Sensory: No sensory deficit. Motor: No weakness. Coordination: Coordination normal.   Psychiatric:         Mood and Affect: Mood normal.         Behavior: Behavior normal.         DDX/DIAGNOSTIC RESULTS / EMERGENCY DEPARTMENT COURSE / MDM     Medical Decision Making  26-year-old female with history of coronary artery disease status post questionable syncopal episodes. Possible unknown chemical exposure at home. Will obtain cardiac work-up, as well as VBG carboxyhemoglobin. We will also check urinalysis. Additional work-up and treatment pending the results of the above studies.     Amount and/or Complexity of Data Reviewed  Independent Historian: EMS  Labs: ordered. Radiology: ordered. ECG/medicine tests: ordered. Risk  OTC drugs. EKG    EKG Interpretation    Interpreted by emergency department physician    Rhythm: normal sinus   Rate: normal  Axis: left  Ectopy: none  Conduction: normal  ST Segments: no acute change  T Waves: no acute change  Q Waves: none    Clinical Impression: Normal sinus rhythm with left axis deviation nonspecific ST and T wave abnormalities. No acute changes when compared to EKG dated 9/1/2022. Nelsy Malin MD      All EKG's are interpreted by the Emergency Department Physician who either signs or Co-signs this chart in the absence of a cardiologist.    EMERGENCY DEPARTMENT COURSE:      ED Course as of 02/17/23 2340   Fri Feb 17, 2023 2249 Work-up is grossly unremarkable. However, patient does have cardiac risk factors and somewhat concerning history of possible syncope tonight. Patient states she is feeling better and is asking to go home. Did have shared decision-making conversation with the patient at bedside regarding risks versus benefits. Patient reports she does understand the risks, but states she does have reliable network of family and neighbors, who can help out. Disposition at this time is informed discharge. [GG]      ED Course User Index  [GG] Nelsy Malin MD       PROCEDURES:  None    CONSULTS:  None    CRITICAL CARE:  None    FINAL IMPRESSION      1. Syncope and collapse    2.  Dizziness          DISPOSITION / PLAN     DISPOSITION Decision To Discharge 02/17/2023 10:56:36 PM      PATIENT REFERRED TO:  [facility]  [address]  [phone]    If symptoms worsen    Anders Calderon MD  [facility] [address] [phone]    In 3 days      DISCHARGE MEDICATIONS:  New Prescriptions    No medications on file       Nelsy Malin MD  Emergency Medicine Resident    (Please note that portions of thisnote were completed with a voice recognition program.  Efforts were made to edit the dictations but occasionally words are mis-transcribed.)       Eren York MD  Resident  02/17/23 9417

## 2023-02-18 NOTE — ED PROVIDER NOTES
[facility] ED  eMERGENCY dEPARTMENT eNCOUnter   Attending Attestation     Pt Name: Karin Bullock  MRN: [id number]  Shukrigfkobe 1945  Date of evaluation: 2/17/23       Karin Bullock is a 68 y.o. female who presents with Chemical Exposure, Shaking, and Dizziness      History: Pt presents with concern for some sort of chemical exposure. Patient states that she was at home playing a game with her neighbor and then when the neighbor left she fell asleep very quickly. Patient then awoke, threw up some food. Patient had eaten a sandwich with her neighbor who brought over fast food chicken sandwich. Patient says that after she threw up she felt very weak and faint and sat down and slept for an hour and a half accidentally. Exam: Heart rate and rhythm are regular. Lungs are clear to auscultation bilaterally. Abdomen is soft, nontender. Patient is awake alert and acting appropriately. Concern for possible syncope in this 26-year-old with history of MI. Will obtain syncope work-up, will obtain urinalysis, will get CO level by monitor. Likely admission based on age, risk factors, history, symptoms today. EKG shows normal sinus rhythm with a rate of 71 bpm.  Left axis deviation. No ST elevation or depression. T waves are upright except in lead V2. Nonspecific EKG. I performed a history and physical examination of the patient and discussed management with the resident. I reviewed the residents note and agree with the documented findings and plan of care. Any areas of disagreement are noted on the chart. I was personally present for the key portions of any procedures. I have documented in the chart those procedures where I was not present during the key portions. I have personally reviewed all images and agree with the resident's interpretation. I have reviewed the emergency nurses triage note.  I agree with the chief complaint, past medical history, past surgical history, allergies, medications, social and family history as documented unless otherwise noted below. Documentation of the HPI, Physical Exam and Medical Decision Making performed by medical students or scribes is based on my personal performance of the HPI, PE and MDM. For Phys Assistant/ Nurse Practitioner cases/documentation I have had a face to face evaluation of this patient and have completed at least one if not all key elements of the E/M (history, physical exam, and MDM). Additional findings are as noted. For APC cases I have personally evaluated and examined the patient in conjunction with the APC and agree with the treatment plan and disposition of the patient as recorded by the APC.     Miriam Escobar MD  Attending Emergency  Physician        Yvonne Woods MD  02/17/23 8579

## 2023-02-18 NOTE — ED NOTES
Pt to ED via Medic 18 due to light headedness, chemical exposure and shaking started at around 6pm PTA. Per EMS there is no gas leak at the patient's house. Per Pt she is playing cards with her friend when she started feeling funny. Denies any chest pain or any SOB during assessment. Pt has a history of heart attack 10 years ago. Pt states she's been compliant with all of her medication. Pt is A&OX4. EKG done, Placed pt in full cardiac monitor, will cont plan of care.      Mitzy Roy, FROYLAN  02/17/23 0542

## 2023-02-18 NOTE — PROGRESS NOTES
[address] [facility] [address]  PROGRESS NOTE    Shift date: 2/17/23  Shift day: Friday   Shift # 2    Room # 17/17   Name: Justice Patton                Advent:    Place of Zoroastrianism:     Referral: Routine Visit    Admit Date & Time: 2/17/2023  7:37 PM    Assessment:  Justice Patton is a 68 y.o. female  Intervention:  Writer introduced self and title as . Patient did not appear to mind  presence and engaged in conversation. Patient discussed her health and its impact. Patient also shared many family memories with  along with past loss. Writer offered space for patient  to express feelings, needs, and concerns and provided a ministry presence. Patient stated she \"can't remember things to well anymore. \"   validated patient feelings and emotions. Outcome:  Patient expressed gratitude for  visit. Plan:  Chaplains will remain available to offer spiritual and emotional support as needed.       Electronically signed by Sayra Ram on 2/17/2023 at 11:57 PM.  [facility]  [phone]

## 2023-02-18 NOTE — ED NOTES
The following labs were labeled with appropriate pt sticker and tubed to lab:     [] Blue     [] Lavender   [] on ice  [x] Green/yellow  [] Green/black [] on ice  [] Mu Adryan  [] on ice  [] Yellow  [] Red  [] Type/ Screen  [] ABG  [] VBG    [] COVID-19 swab    [] Rapid  [] PCR  [] Flu swab  [] Peds Viral Panel     [x] Urine Sample  [] Fecal Sample  [] Pelvic Cultures  [] Blood Cultures  [] X 2  [] STREP Cultures         Nancy Buckley RN  02/17/23 9332

## 2023-02-18 NOTE — ED NOTES
The following labs labeled with pt sticker and tubed to lab:     [x] Blue     [x] Lavender   [] on ice  [x] Green/yellow  [x] Green/black [x] on ice  [] Yellow  [] Red  [] Pink      [] COVID-19 swab    [] Rapid  [] PCR  [] Flu Swab  [] Strep Swab  [] Peds Viral Panel     [] Urine Sample  [] Pelvic Cultures  [] Blood Cultures   [] Wound Cultures          Nomi Pyle RN  02/17/23 1955

## 2023-06-27 ENCOUNTER — HOSPITAL ENCOUNTER (OUTPATIENT)
Age: 78
Setting detail: SPECIMEN
Discharge: HOME | End: 2023-06-27

## 2023-06-27 LAB
ANION GAP SERPL CALCULATED.3IONS-SCNC: 18 MMOL/L (ref 9–17)
BUN SERPL-MCNC: 21 MG/DL (ref 8–23)
CALCIUM SERPL-MCNC: 9.9 MG/DL (ref 8.6–10.4)
CHLORIDE SERPL-SCNC: 100 MMOL/L (ref 98–107)
CHOLEST SERPL-MCNC: 147 MG/DL
CHOLESTEROL/HDL RATIO: 3.7
CO2 SERPL-SCNC: 21 MMOL/L (ref 20–31)
CREAT SERPL-MCNC: 1.08 MG/DL (ref 0.5–0.9)
EST. AVERAGE GLUCOSE BLD GHB EST-MCNC: 298 MG/DL
GFR SERPL CREATININE-BSD FRML MDRD: 53 ML/MIN/1.73M2
GLUCOSE SERPL-MCNC: 283 MG/DL (ref 70–99)
HBA1C MFR BLD: 12 % (ref 4–6)
HDLC SERPL-MCNC: 40 MG/DL
LDLC SERPL CALC-MCNC: 78 MG/DL (ref 0–130)
POTASSIUM SERPL-SCNC: 4.4 MMOL/L (ref 3.7–5.3)
SODIUM SERPL-SCNC: 139 MMOL/L (ref 135–144)
TRIGL SERPL-MCNC: 145 MG/DL
TSH SERPL DL<=0.05 MIU/L-ACNC: 2.09 UIU/ML (ref 0.3–5)

## 2023-09-18 ENCOUNTER — HOSPITAL ENCOUNTER (OUTPATIENT)
Age: 78
Setting detail: SPECIMEN
Discharge: HOME | End: 2023-09-18

## 2023-09-18 LAB
ANION GAP SERPL CALCULATED.3IONS-SCNC: 10 MMOL/L (ref 9–17)
BUN SERPL-MCNC: 19 MG/DL (ref 8–23)
CALCIUM SERPL-MCNC: 9.4 MG/DL (ref 8.6–10.4)
CHLORIDE SERPL-SCNC: 98 MMOL/L (ref 98–107)
CO2 SERPL-SCNC: 25 MMOL/L (ref 20–31)
CREAT SERPL-MCNC: 1.1 MG/DL (ref 0.5–0.9)
CREAT UR-MCNC: 211.1 MG/DL (ref 28–217)
ERYTHROCYTE [DISTWIDTH] IN BLOOD BY AUTOMATED COUNT: 14.3 % (ref 11.8–14.4)
EST. AVERAGE GLUCOSE BLD GHB EST-MCNC: 352 MG/DL
GFR SERPL CREATININE-BSD FRML MDRD: 51 ML/MIN/1.73M2
GLUCOSE SERPL-MCNC: 357 MG/DL (ref 70–99)
HBA1C MFR BLD: 13.9 % (ref 4–6)
HCT VFR BLD AUTO: 35.6 % (ref 36.3–47.1)
HGB BLD-MCNC: 10.9 G/DL (ref 11.9–15.1)
MCH RBC QN AUTO: 23.1 PG (ref 25.2–33.5)
MCHC RBC AUTO-ENTMCNC: 30.6 G/DL (ref 28.4–34.8)
MCV RBC AUTO: 75.4 FL (ref 82.6–102.9)
MICROALBUMIN UR-MCNC: 493 MG/L
MICROALBUMIN/CREAT UR-RTO: 234 MCG/MG CREAT
NRBC BLD-RTO: 0 PER 100 WBC
PLATELET # BLD AUTO: 186 K/UL (ref 138–453)
PMV BLD AUTO: 11.2 FL (ref 8.1–13.5)
POTASSIUM SERPL-SCNC: 3.6 MMOL/L (ref 3.7–5.3)
RBC # BLD AUTO: 4.72 M/UL (ref 3.95–5.11)
SODIUM SERPL-SCNC: 133 MMOL/L (ref 135–144)
VIT B12 SERPL-MCNC: 528 PG/ML (ref 232–1245)
WBC OTHER # BLD: 5.2 K/UL (ref 3.5–11.3)

## 2024-02-02 ENCOUNTER — HOSPITAL ENCOUNTER (OUTPATIENT)
Age: 79
Setting detail: SPECIMEN
Discharge: HOME | End: 2024-02-02

## 2024-02-02 LAB
ANION GAP SERPL CALCULATED.3IONS-SCNC: 13 MMOL/L (ref 9–16)
BASOPHILS # BLD: 0.06 K/UL (ref 0–0.2)
BASOPHILS NFR BLD: 1 % (ref 0–2)
BUN SERPL-MCNC: 29 MG/DL (ref 8–23)
CALCIUM SERPL-MCNC: 10 MG/DL (ref 8.6–10.4)
CHLORIDE SERPL-SCNC: 100 MMOL/L (ref 98–107)
CO2 SERPL-SCNC: 24 MMOL/L (ref 20–31)
CREAT SERPL-MCNC: 1.3 MG/DL (ref 0.5–0.9)
CREAT UR-MCNC: 300.9 MG/DL (ref 28–217)
EOSINOPHIL # BLD: 0.17 K/UL (ref 0–0.44)
EOSINOPHILS RELATIVE PERCENT: 2 % (ref 1–4)
ERYTHROCYTE [DISTWIDTH] IN BLOOD BY AUTOMATED COUNT: 14.6 % (ref 11.8–14.4)
EST. AVERAGE GLUCOSE BLD GHB EST-MCNC: 229 MG/DL
GFR SERPL CREATININE-BSD FRML MDRD: 41 ML/MIN/1.73M2
GLUCOSE P FAST SERPL-MCNC: 219 MG/DL (ref 74–99)
HBA1C MFR BLD: 9.6 % (ref 4–6)
HCT VFR BLD AUTO: 37 % (ref 36.3–47.1)
HGB BLD-MCNC: 11.4 G/DL (ref 11.9–15.1)
IMM GRANULOCYTES # BLD AUTO: <0.03 K/UL (ref 0–0.3)
IMM GRANULOCYTES NFR BLD: 0 %
LYMPHOCYTES NFR BLD: 1.54 K/UL (ref 1.1–3.7)
LYMPHOCYTES RELATIVE PERCENT: 20 % (ref 24–43)
MCH RBC QN AUTO: 23.3 PG (ref 25.2–33.5)
MCHC RBC AUTO-ENTMCNC: 30.8 G/DL (ref 28.4–34.8)
MCV RBC AUTO: 75.5 FL (ref 82.6–102.9)
MICROALBUMIN UR-MCNC: 259 MG/L
MICROALBUMIN/CREAT UR-RTO: 86 MCG/MG CREAT
MONOCYTES NFR BLD: 0.42 K/UL (ref 0.1–1.2)
MONOCYTES NFR BLD: 5 % (ref 3–12)
NEUTROPHILS NFR BLD: 72 % (ref 36–65)
NEUTS SEG NFR BLD: 5.68 K/UL (ref 1.5–8.1)
NRBC BLD-RTO: 0 PER 100 WBC
PLATELET # BLD AUTO: 239 K/UL (ref 138–453)
PMV BLD AUTO: 10.7 FL (ref 8.1–13.5)
POTASSIUM SERPL-SCNC: 4.5 MMOL/L (ref 3.7–5.3)
RBC # BLD AUTO: 4.9 M/UL (ref 3.95–5.11)
RBC # BLD: ABNORMAL 10*6/UL
SODIUM SERPL-SCNC: 137 MMOL/L (ref 136–145)
WBC OTHER # BLD: 7.9 K/UL (ref 3.5–11.3)

## 2024-02-03 LAB
MICROORGANISM SPEC CULT: NORMAL
MICROORGANISM/AGENT SPEC: NORMAL
SPECIMEN DESCRIPTION: NORMAL

## 2024-02-05 LAB
MICROORGANISM SPEC CULT: NORMAL
MICROORGANISM/AGENT SPEC: NORMAL
SPECIMEN DESCRIPTION: NORMAL

## 2024-04-16 ENCOUNTER — APPOINTMENT (OUTPATIENT)
Dept: GENERAL RADIOLOGY | Age: 79
End: 2024-04-16
Payer: MEDICARE

## 2024-04-16 ENCOUNTER — HOSPITAL ENCOUNTER (EMERGENCY)
Age: 79
Discharge: HOME | End: 2024-04-16
Attending: EMERGENCY MEDICINE
Payer: MEDICARE

## 2024-04-16 VITALS
OXYGEN SATURATION: 100 % | WEIGHT: 180 LBS | BODY MASS INDEX: 29.99 KG/M2 | RESPIRATION RATE: 14 BRPM | TEMPERATURE: 97.9 F | SYSTOLIC BLOOD PRESSURE: 150 MMHG | DIASTOLIC BLOOD PRESSURE: 72 MMHG | HEIGHT: 65 IN | HEART RATE: 69 BPM

## 2024-04-16 DIAGNOSIS — S80.00XA CONTUSION OF KNEE, UNSPECIFIED LATERALITY, INITIAL ENCOUNTER: Primary | ICD-10-CM

## 2024-04-16 PROCEDURE — 73564 X-RAY EXAM KNEE 4 OR MORE: CPT

## 2024-04-16 PROCEDURE — 6370000000 HC RX 637 (ALT 250 FOR IP): Performed by: EMERGENCY MEDICINE

## 2024-04-16 PROCEDURE — 99283 EMERGENCY DEPT VISIT LOW MDM: CPT

## 2024-04-16 RX ORDER — TRAMADOL HYDROCHLORIDE 50 MG/1
50 TABLET ORAL ONCE
Status: COMPLETED | OUTPATIENT
Start: 2024-04-16 | End: 2024-04-16

## 2024-04-16 RX ADMIN — TRAMADOL HYDROCHLORIDE 50 MG: 50 TABLET, COATED ORAL at 19:40

## 2024-04-16 ASSESSMENT — ENCOUNTER SYMPTOMS
RHINORRHEA: 0
NAUSEA: 0
DIARRHEA: 0
EYE DISCHARGE: 0
EYE REDNESS: 0
SORE THROAT: 0
COLOR CHANGE: 0
COUGH: 0
SHORTNESS OF BREATH: 0
VOMITING: 0

## 2024-04-16 ASSESSMENT — PAIN - FUNCTIONAL ASSESSMENT: PAIN_FUNCTIONAL_ASSESSMENT: NONE - DENIES PAIN

## 2024-04-16 NOTE — ED PROVIDER NOTES
LISPRO (HUMALOG MIX) (75-25) 100 UNIT PER ML SUSP INJECTION VIAL    Inject 25 Units into the skin daily    LISINOPRIL (PRINIVIL;ZESTRIL) 20 MG TABLET    Take 20 mg by mouth daily.    MAGNESIUM CHLORIDE (MAG DELAY) 535 (64 MG) MG TBCR EXTENDED RELEASE TABLET    Take 64 mg by mouth daily    MELATONIN 10 MG TABS    Take 1 tablet by mouth nightly    MONTELUKAST (SINGULAIR) 10 MG TABLET    Take 10 mg by mouth nightly    MULTIPLE VITAMIN (MULTI-DAY VITAMINS) TABS    Take 1 tablet by mouth daily    SERTRALINE (ZOLOFT) 50 MG TABLET    Take 50 mg by mouth daily    SOLIFENACIN (VESICARE) 5 MG TABLET    Take 10 mg by mouth daily     ALLERGIES     is allergic to doxycycline, farxiga [dapagliflozin], levofloxacin, mobic [meloxicam], morphine, and sulfa antibiotics.  FAMILY HISTORY     has no family status information on file.      SOCIAL HISTORY       Social History     Tobacco Use    Smoking status: Former    Smokeless tobacco: Never   Substance Use Topics    Alcohol use: Yes     Comment: socially    Drug use: No     PHYSICAL EXAM     INITIAL VITALS: BP (!) 150/72   Pulse 69   Temp 97.9 °F (36.6 °C) (Oral)   Resp 14   Ht 1.651 m (5' 5\")   Wt 81.6 kg (180 lb)   SpO2 100%   BMI 29.95 kg/m²    Physical Exam  Constitutional:       Appearance: Normal appearance.   HENT:      Head: Normocephalic and atraumatic.   Eyes:      Extraocular Movements: Extraocular movements intact.      Pupils: Pupils are equal, round, and reactive to light.   Cardiovascular:      Rate and Rhythm: Normal rate and regular rhythm.   Pulmonary:      Effort: Pulmonary effort is normal.      Breath sounds: Normal breath sounds.   Abdominal:      General: Abdomen is flat.      Palpations: Abdomen is soft.      Tenderness: There is no abdominal tenderness.   Musculoskeletal:        Legs:    Neurological:      Mental Status: She is alert.         MEDICAL DECISION MAKING / ED COURSE:   Summary of Patient Presentation:      This is a 78-year-old female with

## 2024-05-31 ENCOUNTER — HOSPITAL ENCOUNTER (OUTPATIENT)
Age: 79
Setting detail: SPECIMEN
Discharge: HOME | End: 2024-05-31

## 2024-05-31 LAB
ALBUMIN SERPL-MCNC: 4.3 G/DL (ref 3.5–5.2)
ALBUMIN/GLOB SERPL: 1 {RATIO} (ref 1–2.5)
ALP SERPL-CCNC: 66 U/L (ref 35–104)
ALT SERPL-CCNC: 12 U/L (ref 10–35)
ANION GAP SERPL CALCULATED.3IONS-SCNC: 12 MMOL/L (ref 9–16)
AST SERPL-CCNC: 22 U/L (ref 10–35)
BILIRUB SERPL-MCNC: 0.6 MG/DL (ref 0–1.2)
BUN SERPL-MCNC: 30 MG/DL (ref 8–23)
CALCIUM SERPL-MCNC: 9.4 MG/DL (ref 8.6–10.4)
CHLORIDE SERPL-SCNC: 105 MMOL/L (ref 98–107)
CO2 SERPL-SCNC: 25 MMOL/L (ref 20–31)
CREAT SERPL-MCNC: 1.4 MG/DL (ref 0.5–0.9)
CREAT UR-MCNC: 166 MG/DL (ref 28–217)
EST. AVERAGE GLUCOSE BLD GHB EST-MCNC: 166 MG/DL
FOLATE SERPL-MCNC: 11.8 NG/ML (ref 4.8–24.2)
GFR, ESTIMATED: 39 ML/MIN/1.73M2
GLUCOSE P FAST SERPL-MCNC: 113 MG/DL (ref 74–99)
HBA1C MFR BLD: 7.4 % (ref 4–6)
MICROALBUMIN UR-MCNC: 146 MG/L (ref 0–20)
MICROALBUMIN/CREAT UR-RTO: 88 MCG/MG CREAT (ref 0–25)
POTASSIUM SERPL-SCNC: 4.4 MMOL/L (ref 3.7–5.3)
PROT SERPL-MCNC: 7.3 G/DL (ref 6.6–8.7)
SODIUM SERPL-SCNC: 142 MMOL/L (ref 136–145)
T4 FREE SERPL-MCNC: 1.3 NG/DL (ref 0.92–1.68)
TSH SERPL DL<=0.05 MIU/L-ACNC: 1.89 UIU/ML (ref 0.27–4.2)
TSH SERPL DL<=0.05 MIU/L-ACNC: 3.33 UIU/ML (ref 0.27–4.2)
VIT B12 SERPL-MCNC: 231 PG/ML (ref 232–1245)

## 2024-07-13 ENCOUNTER — APPOINTMENT (OUTPATIENT)
Dept: CT IMAGING | Age: 79
DRG: 552 | End: 2024-07-13
Payer: MEDICARE

## 2024-07-13 ENCOUNTER — HOSPITAL ENCOUNTER (INPATIENT)
Age: 79
LOS: 1 days | Discharge: NURSING, REHAB OR LONG-TERM CARE | DRG: 552 | End: 2024-07-16
Attending: EMERGENCY MEDICINE | Admitting: SURGERY
Payer: MEDICARE

## 2024-07-13 DIAGNOSIS — S32.021A CLOSED STABLE BURST FRACTURE OF SECOND LUMBAR VERTEBRA, INITIAL ENCOUNTER (HCC): Primary | ICD-10-CM

## 2024-07-13 PROCEDURE — 6360000002 HC RX W HCPCS

## 2024-07-13 PROCEDURE — 99285 EMERGENCY DEPT VISIT HI MDM: CPT

## 2024-07-13 PROCEDURE — 96374 THER/PROPH/DIAG INJ IV PUSH: CPT

## 2024-07-13 PROCEDURE — 6370000000 HC RX 637 (ALT 250 FOR IP)

## 2024-07-13 PROCEDURE — 96372 THER/PROPH/DIAG INJ SC/IM: CPT

## 2024-07-13 PROCEDURE — 72131 CT LUMBAR SPINE W/O DYE: CPT

## 2024-07-13 RX ORDER — LIDOCAINE 4 G/G
1 PATCH TOPICAL ONCE
Status: COMPLETED | OUTPATIENT
Start: 2024-07-13 | End: 2024-07-14

## 2024-07-13 RX ORDER — HALOPERIDOL 5 MG/ML
5 INJECTION INTRAMUSCULAR ONCE
Status: COMPLETED | OUTPATIENT
Start: 2024-07-13 | End: 2024-07-13

## 2024-07-13 RX ORDER — FENTANYL CITRATE 50 UG/ML
25 INJECTION, SOLUTION INTRAMUSCULAR; INTRAVENOUS ONCE
Status: DISCONTINUED | OUTPATIENT
Start: 2024-07-14 | End: 2024-07-16 | Stop reason: HOSPADM

## 2024-07-13 RX ORDER — ACETAMINOPHEN 325 MG/1
650 TABLET ORAL ONCE
Status: COMPLETED | OUTPATIENT
Start: 2024-07-13 | End: 2024-07-13

## 2024-07-13 RX ORDER — GLIMEPIRIDE 4 MG/1
4 TABLET ORAL
COMMUNITY

## 2024-07-13 RX ADMIN — HALOPERIDOL LACTATE 5 MG: 5 INJECTION, SOLUTION INTRAMUSCULAR at 23:55

## 2024-07-13 RX ADMIN — ACETAMINOPHEN 650 MG: 325 TABLET ORAL at 23:55

## 2024-07-13 ASSESSMENT — PAIN - FUNCTIONAL ASSESSMENT: PAIN_FUNCTIONAL_ASSESSMENT: 0-10

## 2024-07-13 ASSESSMENT — PAIN SCALES - GENERAL: PAINLEVEL_OUTOF10: 10

## 2024-07-14 ENCOUNTER — APPOINTMENT (OUTPATIENT)
Dept: MRI IMAGING | Age: 79
DRG: 552 | End: 2024-07-14
Payer: MEDICARE

## 2024-07-14 PROBLEM — S32.021A CLOSED STABLE BURST FRACTURE OF SECOND LUMBAR VERTEBRA (HCC): Status: ACTIVE | Noted: 2024-07-14

## 2024-07-14 PROBLEM — S32.001A LUMBAR BURST FRACTURE, CLOSED, INITIAL ENCOUNTER (HCC): Status: ACTIVE | Noted: 2024-07-14

## 2024-07-14 LAB
25(OH)D3 SERPL-MCNC: 22.6 NG/ML (ref 30–100)
ALBUMIN SERPL-MCNC: 4.2 G/DL (ref 3.5–5.2)
AMPHET UR QL SCN: NEGATIVE
ANION GAP SERPL CALCULATED.3IONS-SCNC: 15 MMOL/L (ref 9–16)
BACTERIA URNS QL MICRO: NORMAL
BARBITURATES UR QL SCN: NEGATIVE
BENZODIAZ UR QL: NEGATIVE
BILIRUB UR QL STRIP: NEGATIVE
BUN SERPL-MCNC: 28 MG/DL (ref 8–23)
CALCIUM SERPL-MCNC: 9.9 MG/DL (ref 8.6–10.4)
CANNABINOIDS UR QL SCN: NEGATIVE
CASTS #/AREA URNS LPF: NORMAL /LPF (ref 0–8)
CHLORIDE SERPL-SCNC: 104 MMOL/L (ref 98–107)
CLARITY UR: ABNORMAL
CO2 SERPL-SCNC: 17 MMOL/L (ref 20–31)
COCAINE UR QL SCN: NEGATIVE
COLOR UR: YELLOW
CREAT SERPL-MCNC: 1.4 MG/DL (ref 0.5–0.9)
EPI CELLS #/AREA URNS HPF: NORMAL /HPF (ref 0–5)
ERYTHROCYTE [DISTWIDTH] IN BLOOD BY AUTOMATED COUNT: 14.3 % (ref 11.8–14.4)
EST. AVERAGE GLUCOSE BLD GHB EST-MCNC: 148 MG/DL
FENTANYL UR QL: NEGATIVE
FOLATE SERPL-MCNC: 14.6 NG/ML (ref 4.8–24.2)
GFR, ESTIMATED: 38 ML/MIN/1.73M2
GLUCOSE BLD-MCNC: 200 MG/DL (ref 65–105)
GLUCOSE SERPL-MCNC: 116 MG/DL (ref 74–99)
GLUCOSE UR STRIP-MCNC: ABNORMAL MG/DL
HBA1C MFR BLD: 6.8 % (ref 4–6)
HCT VFR BLD AUTO: 33.9 % (ref 36.3–47.1)
HGB BLD-MCNC: 10.4 G/DL (ref 11.9–15.1)
HGB UR QL STRIP.AUTO: ABNORMAL
KETONES UR STRIP-MCNC: NEGATIVE MG/DL
LEUKOCYTE ESTERASE UR QL STRIP: ABNORMAL
MCH RBC QN AUTO: 23.4 PG (ref 25.2–33.5)
MCHC RBC AUTO-ENTMCNC: 30.7 G/DL (ref 28.4–34.8)
MCV RBC AUTO: 76.4 FL (ref 82.6–102.9)
METHADONE UR QL: NEGATIVE
NITRITE UR QL STRIP: NEGATIVE
NRBC BLD-RTO: 0 PER 100 WBC
OPIATES UR QL SCN: NEGATIVE
OXYCODONE UR QL SCN: NEGATIVE
PCP UR QL SCN: NEGATIVE
PH UR STRIP: 5.5 [PH] (ref 5–8)
PLATELET # BLD AUTO: 212 K/UL (ref 138–453)
PMV BLD AUTO: 10.2 FL (ref 8.1–13.5)
POTASSIUM SERPL-SCNC: 3.4 MMOL/L (ref 3.7–5.3)
PROT UR STRIP-MCNC: ABNORMAL MG/DL
RBC # BLD AUTO: 4.44 M/UL (ref 3.95–5.11)
RBC #/AREA URNS HPF: NORMAL /HPF (ref 0–4)
SODIUM SERPL-SCNC: 136 MMOL/L (ref 136–145)
SP GR UR STRIP: 1.02 (ref 1–1.03)
T4 FREE SERPL-MCNC: 1.1 NG/DL (ref 0.92–1.68)
TEST INFORMATION: NORMAL
TSH SERPL DL<=0.05 MIU/L-ACNC: 2.78 UIU/ML (ref 0.27–4.2)
UROBILINOGEN UR STRIP-ACNC: NORMAL EU/DL (ref 0–1)
VIT B12 SERPL-MCNC: 455 PG/ML (ref 232–1245)
WBC #/AREA URNS HPF: NORMAL /HPF (ref 0–5)
WBC OTHER # BLD: 7.8 K/UL (ref 3.5–11.3)

## 2024-07-14 PROCEDURE — 85027 COMPLETE CBC AUTOMATED: CPT

## 2024-07-14 PROCEDURE — 80307 DRUG TEST PRSMV CHEM ANLYZR: CPT

## 2024-07-14 PROCEDURE — 81001 URINALYSIS AUTO W/SCOPE: CPT

## 2024-07-14 PROCEDURE — 87086 URINE CULTURE/COLONY COUNT: CPT

## 2024-07-14 PROCEDURE — 6370000000 HC RX 637 (ALT 250 FOR IP)

## 2024-07-14 PROCEDURE — 2580000003 HC RX 258

## 2024-07-14 PROCEDURE — 84439 ASSAY OF FREE THYROXINE: CPT

## 2024-07-14 PROCEDURE — 82947 ASSAY GLUCOSE BLOOD QUANT: CPT

## 2024-07-14 PROCEDURE — 36415 COLL VENOUS BLD VENIPUNCTURE: CPT

## 2024-07-14 PROCEDURE — G0378 HOSPITAL OBSERVATION PER HR: HCPCS

## 2024-07-14 PROCEDURE — 72148 MRI LUMBAR SPINE W/O DYE: CPT

## 2024-07-14 PROCEDURE — 6370000000 HC RX 637 (ALT 250 FOR IP): Performed by: STUDENT IN AN ORGANIZED HEALTH CARE EDUCATION/TRAINING PROGRAM

## 2024-07-14 PROCEDURE — 6360000002 HC RX W HCPCS

## 2024-07-14 PROCEDURE — 82746 ASSAY OF FOLIC ACID SERUM: CPT

## 2024-07-14 PROCEDURE — 82607 VITAMIN B-12: CPT

## 2024-07-14 PROCEDURE — 84443 ASSAY THYROID STIM HORMONE: CPT

## 2024-07-14 PROCEDURE — 82306 VITAMIN D 25 HYDROXY: CPT

## 2024-07-14 PROCEDURE — 82040 ASSAY OF SERUM ALBUMIN: CPT

## 2024-07-14 PROCEDURE — 80048 BASIC METABOLIC PNL TOTAL CA: CPT

## 2024-07-14 PROCEDURE — 83036 HEMOGLOBIN GLYCOSYLATED A1C: CPT

## 2024-07-14 RX ORDER — ONDANSETRON 4 MG/1
4 TABLET, ORALLY DISINTEGRATING ORAL EVERY 8 HOURS PRN
Status: DISCONTINUED | OUTPATIENT
Start: 2024-07-14 | End: 2024-07-16 | Stop reason: HOSPADM

## 2024-07-14 RX ORDER — GLUCAGON 1 MG/ML
1 KIT INJECTION PRN
Status: DISCONTINUED | OUTPATIENT
Start: 2024-07-14 | End: 2024-07-16 | Stop reason: HOSPADM

## 2024-07-14 RX ORDER — SODIUM CHLORIDE 0.9 % (FLUSH) 0.9 %
5-40 SYRINGE (ML) INJECTION PRN
Status: DISCONTINUED | OUTPATIENT
Start: 2024-07-14 | End: 2024-07-16 | Stop reason: HOSPADM

## 2024-07-14 RX ORDER — POLYETHYLENE GLYCOL 3350 17 G/17G
17 POWDER, FOR SOLUTION ORAL DAILY
Status: DISCONTINUED | OUTPATIENT
Start: 2024-07-14 | End: 2024-07-16 | Stop reason: HOSPADM

## 2024-07-14 RX ORDER — INSULIN LISPRO 100 [IU]/ML
0-16 INJECTION, SOLUTION INTRAVENOUS; SUBCUTANEOUS
Status: DISCONTINUED | OUTPATIENT
Start: 2024-07-14 | End: 2024-07-16 | Stop reason: HOSPADM

## 2024-07-14 RX ORDER — DEXTROSE MONOHYDRATE 100 MG/ML
INJECTION, SOLUTION INTRAVENOUS CONTINUOUS PRN
Status: DISCONTINUED | OUTPATIENT
Start: 2024-07-14 | End: 2024-07-16 | Stop reason: HOSPADM

## 2024-07-14 RX ORDER — SODIUM CHLORIDE 9 MG/ML
INJECTION, SOLUTION INTRAVENOUS PRN
Status: DISCONTINUED | OUTPATIENT
Start: 2024-07-14 | End: 2024-07-16 | Stop reason: HOSPADM

## 2024-07-14 RX ORDER — QUETIAPINE FUMARATE 25 MG/1
25 TABLET, FILM COATED ORAL ONCE
Status: COMPLETED | OUTPATIENT
Start: 2024-07-14 | End: 2024-07-14

## 2024-07-14 RX ORDER — INSULIN LISPRO 100 [IU]/ML
0-4 INJECTION, SOLUTION INTRAVENOUS; SUBCUTANEOUS NIGHTLY
Status: DISCONTINUED | OUTPATIENT
Start: 2024-07-14 | End: 2024-07-16 | Stop reason: HOSPADM

## 2024-07-14 RX ORDER — FENTANYL CITRATE 50 UG/ML
50 INJECTION, SOLUTION INTRAMUSCULAR; INTRAVENOUS ONCE
Status: COMPLETED | OUTPATIENT
Start: 2024-07-14 | End: 2024-07-14

## 2024-07-14 RX ORDER — OXYCODONE HYDROCHLORIDE 5 MG/1
2.5 TABLET ORAL EVERY 4 HOURS PRN
Status: DISCONTINUED | OUTPATIENT
Start: 2024-07-14 | End: 2024-07-16 | Stop reason: HOSPADM

## 2024-07-14 RX ORDER — FENTANYL CITRATE 50 UG/ML
25 INJECTION, SOLUTION INTRAMUSCULAR; INTRAVENOUS ONCE
Status: COMPLETED | OUTPATIENT
Start: 2024-07-14 | End: 2024-07-14

## 2024-07-14 RX ORDER — ACETAMINOPHEN 500 MG
1000 TABLET ORAL EVERY 8 HOURS SCHEDULED
Status: DISCONTINUED | OUTPATIENT
Start: 2024-07-14 | End: 2024-07-16 | Stop reason: HOSPADM

## 2024-07-14 RX ORDER — SODIUM CHLORIDE 0.9 % (FLUSH) 0.9 %
5-40 SYRINGE (ML) INJECTION EVERY 12 HOURS SCHEDULED
Status: DISCONTINUED | OUTPATIENT
Start: 2024-07-14 | End: 2024-07-16 | Stop reason: HOSPADM

## 2024-07-14 RX ORDER — ONDANSETRON 2 MG/ML
4 INJECTION INTRAMUSCULAR; INTRAVENOUS EVERY 6 HOURS PRN
Status: DISCONTINUED | OUTPATIENT
Start: 2024-07-14 | End: 2024-07-16 | Stop reason: HOSPADM

## 2024-07-14 RX ADMIN — OXYCODONE 2.5 MG: 5 TABLET ORAL at 11:49

## 2024-07-14 RX ADMIN — ACETAMINOPHEN 1000 MG: 500 TABLET ORAL at 09:11

## 2024-07-14 RX ADMIN — SODIUM CHLORIDE, PRESERVATIVE FREE 10 ML: 5 INJECTION INTRAVENOUS at 09:12

## 2024-07-14 RX ADMIN — FENTANYL CITRATE 50 MCG: 50 INJECTION, SOLUTION INTRAMUSCULAR; INTRAVENOUS at 09:23

## 2024-07-14 RX ADMIN — ACETAMINOPHEN 1000 MG: 500 TABLET ORAL at 14:44

## 2024-07-14 RX ADMIN — FENTANYL CITRATE 25 MCG: 50 INJECTION, SOLUTION INTRAMUSCULAR; INTRAVENOUS at 00:18

## 2024-07-14 RX ADMIN — OXYCODONE 2.5 MG: 5 TABLET ORAL at 23:47

## 2024-07-14 RX ADMIN — QUETIAPINE FUMARATE 25 MG: 25 TABLET ORAL at 20:55

## 2024-07-14 RX ADMIN — ACETAMINOPHEN 1000 MG: 500 TABLET ORAL at 20:54

## 2024-07-14 RX ADMIN — POTASSIUM BICARBONATE 40 MEQ: 782 TABLET, EFFERVESCENT ORAL at 09:12

## 2024-07-14 RX ADMIN — POLYETHYLENE GLYCOL 3350 17 G: 17 POWDER, FOR SOLUTION ORAL at 09:12

## 2024-07-14 RX ADMIN — POTASSIUM BICARBONATE 40 MEQ: 782 TABLET, EFFERVESCENT ORAL at 20:55

## 2024-07-14 RX ADMIN — SODIUM CHLORIDE, PRESERVATIVE FREE 10 ML: 5 INJECTION INTRAVENOUS at 20:56

## 2024-07-14 RX ADMIN — OXYCODONE 2.5 MG: 5 TABLET ORAL at 03:25

## 2024-07-14 ASSESSMENT — PAIN DESCRIPTION - ORIENTATION
ORIENTATION: LOWER

## 2024-07-14 ASSESSMENT — PAIN DESCRIPTION - DESCRIPTORS
DESCRIPTORS: ACHING
DESCRIPTORS: ACHING;THROBBING;DISCOMFORT
DESCRIPTORS: ACHING

## 2024-07-14 ASSESSMENT — PAIN SCALES - GENERAL
PAINLEVEL_OUTOF10: 0
PAINLEVEL_OUTOF10: 3
PAINLEVEL_OUTOF10: 7
PAINLEVEL_OUTOF10: 7
PAINLEVEL_OUTOF10: 1
PAINLEVEL_OUTOF10: 7
PAINLEVEL_OUTOF10: 2
PAINLEVEL_OUTOF10: 7
PAINLEVEL_OUTOF10: 7
PAINLEVEL_OUTOF10: 6
PAINLEVEL_OUTOF10: 5
PAINLEVEL_OUTOF10: 7

## 2024-07-14 ASSESSMENT — PAIN DESCRIPTION - LOCATION
LOCATION: BACK

## 2024-07-14 ASSESSMENT — PAIN SCALES - WONG BAKER: WONGBAKER_NUMERICALRESPONSE: NO HURT

## 2024-07-14 NOTE — ED NOTES
Lab called to add on uds, running now  
Pt. Presents to the ED for back pain s/p a fall out of bed 2 weeks ago. Pt family at the bedside state that she has seen her primary doctor for this to get imaging, and pt was prescribed tramadol. Family states that they have not been able to get the pt to her appt for the imaging. Family states that the pt's dementia has been getting worse, and she has begun to fall more and more. Family is attempting to arrange home health care to come and take care of pt. On examination, pt is restless and cannot get comfortable in bed. Pt resp even and non labored. Pt placed on bp and pulse ox. Resident at the bedside for evaluation. Will continue with plan of car.e   
Pt. Resting comfortably in bed with even and non labored resp. Pt states that she is feeling more comfortable now. Pt denies any needs at this time. Will continue with plan of care.  
mL    sodium chloride flush 0.9 % injection 5-40 mL    0.9 % sodium chloride infusion    OR Linked Order Group     ondansetron (ZOFRAN-ODT) disintegrating tablet 4 mg     ondansetron (ZOFRAN) injection 4 mg    polyethylene glycol (GLYCOLAX) packet 17 g    acetaminophen (TYLENOL) tablet 1,000 mg    oxyCODONE (ROXICODONE) immediate release tablet 2.5 mg       SURGICAL HISTORY       Past Surgical History:   Procedure Laterality Date    ABDOMEN SURGERY Left 9/10/2019    ABDOMEN DEBRIDEMENT WOUND CLOSURE ABDOMINAL LEFT LOWER performed by Phoenix Jovel DO at [facility]    APPENDECTOMY      CHOLECYSTECTOMY      COLONOSCOPY      ENDOSCOPY, COLON, DIAGNOSTIC      HYSTERECTOMY (CERVIX STATUS UNKNOWN)      KNEE SURGERY Left     OTHER SURGICAL HISTORY Left 09/10/2019    Incision and drainage of left lower abdominal wall abscess    TONSILLECTOMY      VULVA SURGERY  12/2017    Due to necrotizing fascitis       PAST MEDICAL HISTORY       Past Medical History:   Diagnosis Date    Anxiety     Cellulitis 12/2017    left labial cellulitis / necrotizing fascitis     Depression     Diabetes mellitus (HCC)     History of blood transfusion     During hysterectomy    Hyperlipidemia     Hypertension     Myocardial infarct (HCC)     time 3       Labs:  Labs Reviewed   URINALYSIS WITH REFLEX TO CULTURE - Abnormal; Notable for the following components:       Result Value    Turbidity UA Turbid (*)     Glucose, Ur TRACE (*)     Urine Hgb TRACE (*)     Protein, UA 2+ (*)     Leukocyte Esterase, Urine SMALL (*)     All other components within normal limits   MICROSCOPIC URINALYSIS   URINE DRUG SCREEN   TSH   T4, FREE   ALBUMIN   HEMOGLOBIN A1C   VITAMIN D 25 HYDROXY   VITAMIN B12 & FOLATE   CBC   BASIC METABOLIC PANEL       Electronically signed by Yfn Escamilla RN on 7/14/2024 at 1:42 AM

## 2024-07-14 NOTE — CONSULTS
Active Problem List   Diagnosis    Hyperlipidemia    Diabetes mellitus (HCC)    Confusion caused by a drug    Intentional drug overdose (HCC)    Coronary artery disease involving native coronary artery of native heart without angina pectoris    HTN (hypertension), benign    Environmental allergies    Suicidal deliberate poisoning (HCC)    Severe recurrent major depression without psychotic features (HCC)    Pressure ulcer of foot    Diabetes mellitus due to underlying condition with foot ulcer (HCC)    Lumbar burst fracture, closed, initial encounter ([facility])       A/P:  Ana Domingo is a 78 y.o. female who presents with low back pain after a fall while getting out of bed approximately 2 weeks ago, found to have L2 superior endplate burst fracture and L1 inferior endplate compression fracture    Patient care will be discussed with attending, will reevaluate patient along with attending     - Further recommendations pending MRI of lumbar spine  -Spoke with daughter who is unsure of which treatments they would pursue, would prefer to get results of MRI and discuss treatment options at that time   - Obtain MRI Lumbar spine   - Neuro checks per protocol      Additional recommendations may follow    Please contact neurosurgery with any changes in patient's neurologic status.     Thank you for your consult.       Marcelina Neves MD  Emergency Medicine Resident, PGY-2  Neurosurgery/Neuro Critical Care Service  7/14/2024 1:40 AM      Please note that this chart was generated using voice recognition Dragon dictation software.  Although every effort was made to ensure the accuracy of this automated transcription, some errors in transcription may have occurred.

## 2024-07-14 NOTE — H&P
TRAUMA H&P/CONSULT    PATIENT NAME: Ana Domingo  YOB: 1945  MEDICAL RECORD NO. [id number]   DATE: 7/14/2024  PRIMARY CARE PHYSICIAN: Destiny Sauceda MD    ACTIVATION   Trauma Consult      IMPRESSION AND PLAN:       Diagnosis: Subacute L2 potentially unstable burst fx, L1 compression fx   Plan: Consult neurosurgery    Pain control    Admit to observation    Monitor vitals    CONSULT SERVICES    Neurosurgery      HISTORY:     Chief Complaint:  \"I don't know how I fell\"    GENERAL DATA  Patient information was obtained from patient and relative(s) daughter.  History/Exam limitations: dementia.  Injury Date: approximately 2 weeks ago   Approximate Injury Time: unknown        Transport mode: Private Auto  Referring Hospital: None    SETTING OF TRAUMATIC EVENT   Location : Home  Specific Details of Location: Bedroom    MECHANISM OF INJURY    Fall Other      HISTORY:     Ana Domingo is a female that presented to the Emergency Department following a fall approximately 2 weeks ago. She has dementia and is confused at baseline, Daughter states no one witnessed the fall so they are unsure of what happened, a passer by heard her screaming and called 911. EMS did not take her to the hospital, she follow up outpatient with her PCP who ordered xrays.  The patient was unable to get these done.  Her pain slowly worsened until her family brought her in today. She is not on AC, unknown if she hit her head or lost consciousness.     Traumatic loss of Consciousness: Unknown    Total Fluids Given Prior To Arrival  mL    MEDICATIONS:   []  None     []  Information not available due to exam limitations documented above    Prior to Admission medications    Medication Sig Start Date End Date Taking? Authorizing Provider   glimepiride (AMARYL) 4 MG tablet Take 1 tablet by mouth every morning (before breakfast)    Provider, MD Norberto   insulin lispro protamine & lispro (HUMALOG MIX) (75-25) 100 UNIT per ML SUSP

## 2024-07-14 NOTE — ED PROVIDER NOTES
[facility] ORTHO/MED SURG  Emergency Department Encounter  Emergency Medicine Resident     Pt Name:Ana Domingo  MRN: [id number]  Birthdate 1945  Date of evaluation: 7/13/24  PCP:  Destiny Sauceda MD  Note Started: 9:24 PM EDT      CHIEF COMPLAINT       Chief Complaint   Patient presents with    Back Pain     S/p fall 2 weeks ago       HISTORY OF PRESENT ILLNESS  (Location/Symptom, Timing/Onset, Context/Setting, Quality, Duration, Modifying Factors, Severity.)      Ana Domingo is a 78 y.o. female who presents with lower back pain that started after falling out of her bed approximately 2 weeks ago.  Pain has been getting worse and patient was seen by the family physician earlier this week with plans to get an x-ray but family is unable to take her in for Xray.  Patient has a significant history for dementia and poor historian.  Unclear how she fell out of bed or how EMS was initially called.  Currently denies saddle anesthesia, loss of bowel or bladder control or history of IV drug use.  Denies fevers or chills, abdominal pain, chest pain or shortness of breath    PAST MEDICAL / SURGICAL / SOCIAL / FAMILY HISTORY      has a past medical history of Anxiety, Cellulitis, Depression, Diabetes mellitus (HCC), History of blood transfusion, Hyperlipidemia, Hypertension, and Myocardial infarct (HCC).     has a past surgical history that includes Cholecystectomy; Hysterectomy; Tonsillectomy; knee surgery (Left); Appendectomy; Vulva surgery (12/2017); Colonoscopy; Endoscopy, colon, diagnostic; other surgical history (Left, 09/10/2019); and Abdomen surgery (Left, 9/10/2019).    Social History     Socioeconomic History    Marital status: Single     Spouse name: Not on file    Number of children: Not on file    Years of education: Not on file    Highest education level: Not on file   Occupational History    Not on file   Tobacco Use    Smoking status: Former    Smokeless tobacco: Never   Substance and Sexual

## 2024-07-14 NOTE — CARE COORDINATION
Transitional Planning    [id number] PC from Yohana at [facility], states she was originally following patient but has been told by patient's daughter she was admitted.  Writer will f/u with patient and daughter regarding this.  Yohana can be reached at [phone]. 1630 Attempted to complete assessment, patient is sleeping and no family is in the room.

## 2024-07-14 NOTE — ED PROVIDER NOTES
[facility]   Emergency Department  Emergency Medicine Attending Sign-out   Note started: 12:53 PM EDT    Care of Ana Domingo was assumed from previous attending Dr. Yuen at 11 PM and is being seen for Back Pain (S/p fall 2 weeks ago)  .  The patient's initial evaluation and plan have been discussed with the prior provider who initially evaluated the patient.     Attestation  I was available and discussed any additional care issues that arose and coordinated the management plans with the resident(s) caring for the patient during my duty period. Any areas of disagreement with resident's documentation of care or procedures are noted on the chart. I was personally present for the key portions of any/all procedures, during my duty period. I have documented in the chart those procedures where I was not present during the key portions.     BRIEF PATIENT SUMMARY/MDM COURSE PER INITIAL PROVIDER:   RECENT VITALS:     Temp: 98 °F (36.7 °C),  Pulse: 69, Respirations: 16, BP: (!) 162/75, SpO2: 99 %    This patient is a 78 y.o. Female with fall and difficulty ambulating.  Has lower back pain.  Awaiting imaging.       OUTSTANDING TASKS / ADDITIONAL COMMENTS   Imaging  Patient was having acute confusion and trying to get out of bed.  Haldol ordered.    Found to have an L2 burst fracture  Trauma consult  Neurosurgery consult  Admit    Conchita Graves MD  Emergency Medicine Attending  [facility]       Conchita Graves MD  07/14/24 [id number]

## 2024-07-14 NOTE — ED PROVIDER NOTES
[facility]     Emergency Department     Faculty Attestation    I performed a history and physical examination of the patient and discussed management with the resident. I reviewed the resident´s note and agree with the documented findings and plan of care. Any areas of disagreement are noted on the chart. I was personally present for the key portions of any procedures. I have documented in the chart those procedures where I was not present during the key portions. I have reviewed the emergency nurses triage note. I agree with the chief complaint, past medical history, past surgical history, allergies, medications, social and family history as documented unless otherwise noted below. For Physician Assistant/ Nurse Practitioner cases/documentation I have personally evaluated this patient and have completed at least one if not all key elements of the E/M (history, physical exam, and MDM). Additional findings are as noted.    Fall approximately 2 weeks ago, pain in the upper lumbar spine, no pulsatile mass appreciated in the abdomen.  Patient appears uncomfortable.     Matt Yuen MD  07/13/24 [id number]

## 2024-07-15 LAB
ANION GAP SERPL CALCULATED.3IONS-SCNC: 13 MMOL/L (ref 9–16)
BUN SERPL-MCNC: 21 MG/DL (ref 8–23)
CALCIUM SERPL-MCNC: 9.9 MG/DL (ref 8.6–10.4)
CHLORIDE SERPL-SCNC: 106 MMOL/L (ref 98–107)
CO2 SERPL-SCNC: 21 MMOL/L (ref 20–31)
CREAT SERPL-MCNC: 1.2 MG/DL (ref 0.5–0.9)
ERYTHROCYTE [DISTWIDTH] IN BLOOD BY AUTOMATED COUNT: 14.3 % (ref 11.8–14.4)
GFR, ESTIMATED: 46 ML/MIN/1.73M2
GLUCOSE BLD-MCNC: 113 MG/DL (ref 65–105)
GLUCOSE BLD-MCNC: 160 MG/DL (ref 65–105)
GLUCOSE BLD-MCNC: 167 MG/DL (ref 65–105)
GLUCOSE BLD-MCNC: 200 MG/DL (ref 65–105)
GLUCOSE BLD-MCNC: 266 MG/DL (ref 65–105)
GLUCOSE BLD-MCNC: 36 MG/DL (ref 65–105)
GLUCOSE BLD-MCNC: 61 MG/DL (ref 65–105)
GLUCOSE SERPL-MCNC: 142 MG/DL (ref 74–99)
HCT VFR BLD AUTO: 35 % (ref 36.3–47.1)
HGB BLD-MCNC: 11.5 G/DL (ref 11.9–15.1)
MCH RBC QN AUTO: 24.1 PG (ref 25.2–33.5)
MCHC RBC AUTO-ENTMCNC: 32.9 G/DL (ref 28.4–34.8)
MCV RBC AUTO: 73.4 FL (ref 82.6–102.9)
MICROORGANISM SPEC CULT: NORMAL
NRBC BLD-RTO: 0 PER 100 WBC
PLATELET # BLD AUTO: 227 K/UL (ref 138–453)
PMV BLD AUTO: 10 FL (ref 8.1–13.5)
POTASSIUM SERPL-SCNC: 4 MMOL/L (ref 3.7–5.3)
RBC # BLD AUTO: 4.77 M/UL (ref 3.95–5.11)
SODIUM SERPL-SCNC: 140 MMOL/L (ref 136–145)
SPECIMEN DESCRIPTION: NORMAL
WBC OTHER # BLD: 7.3 K/UL (ref 3.5–11.3)

## 2024-07-15 PROCEDURE — 85027 COMPLETE CBC AUTOMATED: CPT

## 2024-07-15 PROCEDURE — 97530 THERAPEUTIC ACTIVITIES: CPT

## 2024-07-15 PROCEDURE — G0378 HOSPITAL OBSERVATION PER HR: HCPCS

## 2024-07-15 PROCEDURE — 97116 GAIT TRAINING THERAPY: CPT

## 2024-07-15 PROCEDURE — 97162 PT EVAL MOD COMPLEX 30 MIN: CPT

## 2024-07-15 PROCEDURE — 2580000003 HC RX 258

## 2024-07-15 PROCEDURE — 36415 COLL VENOUS BLD VENIPUNCTURE: CPT

## 2024-07-15 PROCEDURE — 80048 BASIC METABOLIC PNL TOTAL CA: CPT

## 2024-07-15 PROCEDURE — 6360000002 HC RX W HCPCS: Performed by: STUDENT IN AN ORGANIZED HEALTH CARE EDUCATION/TRAINING PROGRAM

## 2024-07-15 PROCEDURE — 6370000000 HC RX 637 (ALT 250 FOR IP): Performed by: STUDENT IN AN ORGANIZED HEALTH CARE EDUCATION/TRAINING PROGRAM

## 2024-07-15 PROCEDURE — 97166 OT EVAL MOD COMPLEX 45 MIN: CPT

## 2024-07-15 PROCEDURE — 6370000000 HC RX 637 (ALT 250 FOR IP)

## 2024-07-15 PROCEDURE — 82947 ASSAY GLUCOSE BLOOD QUANT: CPT

## 2024-07-15 PROCEDURE — 2500000003 HC RX 250 WO HCPCS

## 2024-07-15 PROCEDURE — 92523 SPEECH SOUND LANG COMPREHEN: CPT

## 2024-07-15 PROCEDURE — 97535 SELF CARE MNGMENT TRAINING: CPT

## 2024-07-15 RX ORDER — ASPIRIN 325 MG/1
325 TABLET, FILM COATED ORAL DAILY
Status: DISCONTINUED | OUTPATIENT
Start: 2024-07-15 | End: 2024-07-16 | Stop reason: HOSPADM

## 2024-07-15 RX ORDER — HEPARIN SODIUM 5000 [USP'U]/ML
5000 INJECTION, SOLUTION INTRAVENOUS; SUBCUTANEOUS EVERY 8 HOURS SCHEDULED
Status: DISCONTINUED | OUTPATIENT
Start: 2024-07-15 | End: 2024-07-16 | Stop reason: HOSPADM

## 2024-07-15 RX ORDER — ATORVASTATIN CALCIUM 80 MG/1
80 TABLET, FILM COATED ORAL DAILY
Status: DISCONTINUED | OUTPATIENT
Start: 2024-07-15 | End: 2024-07-16 | Stop reason: HOSPADM

## 2024-07-15 RX ORDER — FLUOXETINE HYDROCHLORIDE 20 MG/1
40 CAPSULE ORAL DAILY
Status: DISCONTINUED | OUTPATIENT
Start: 2024-07-15 | End: 2024-07-15

## 2024-07-15 RX ORDER — GLIPIZIDE 10 MG/1
10 TABLET ORAL
Status: DISCONTINUED | OUTPATIENT
Start: 2024-07-15 | End: 2024-07-16 | Stop reason: HOSPADM

## 2024-07-15 RX ORDER — UREA 10 %
10 LOTION (ML) TOPICAL NIGHTLY
Status: DISCONTINUED | OUTPATIENT
Start: 2024-07-15 | End: 2024-07-16 | Stop reason: HOSPADM

## 2024-07-15 RX ORDER — MONTELUKAST SODIUM 10 MG/1
10 TABLET ORAL NIGHTLY
Status: DISCONTINUED | OUTPATIENT
Start: 2024-07-15 | End: 2024-07-16 | Stop reason: HOSPADM

## 2024-07-15 RX ORDER — INSULIN GLARGINE 100 [IU]/ML
22 INJECTION, SOLUTION SUBCUTANEOUS DAILY
Status: DISCONTINUED | OUTPATIENT
Start: 2024-07-15 | End: 2024-07-16

## 2024-07-15 RX ORDER — TROSPIUM CHLORIDE 20 MG/1
20 TABLET, FILM COATED ORAL NIGHTLY
Status: DISCONTINUED | OUTPATIENT
Start: 2024-07-15 | End: 2024-07-16 | Stop reason: HOSPADM

## 2024-07-15 RX ORDER — LISINOPRIL 20 MG/1
20 TABLET ORAL DAILY
Status: DISCONTINUED | OUTPATIENT
Start: 2024-07-15 | End: 2024-07-16 | Stop reason: HOSPADM

## 2024-07-15 RX ORDER — DEXTROSE MONOHYDRATE, SODIUM CHLORIDE, AND POTASSIUM CHLORIDE 50; 1.49; 9 G/1000ML; G/1000ML; G/1000ML
INJECTION, SOLUTION INTRAVENOUS CONTINUOUS
Status: DISCONTINUED | OUTPATIENT
Start: 2024-07-15 | End: 2024-07-16

## 2024-07-15 RX ORDER — CARVEDILOL 12.5 MG/1
12.5 TABLET ORAL 2 TIMES DAILY
Status: DISCONTINUED | OUTPATIENT
Start: 2024-07-15 | End: 2024-07-16 | Stop reason: HOSPADM

## 2024-07-15 RX ADMIN — SODIUM CHLORIDE, PRESERVATIVE FREE 10 ML: 5 INJECTION INTRAVENOUS at 10:12

## 2024-07-15 RX ADMIN — ACETAMINOPHEN 1000 MG: 500 TABLET ORAL at 22:46

## 2024-07-15 RX ADMIN — CARVEDILOL 12.5 MG: 12.5 TABLET, FILM COATED ORAL at 10:11

## 2024-07-15 RX ADMIN — MONTELUKAST SODIUM 10 MG: 10 TABLET, FILM COATED ORAL at 22:39

## 2024-07-15 RX ADMIN — Medication 10 MG: at 22:40

## 2024-07-15 RX ADMIN — LISINOPRIL 20 MG: 20 TABLET ORAL at 10:11

## 2024-07-15 RX ADMIN — ACETAMINOPHEN 1000 MG: 500 TABLET ORAL at 05:52

## 2024-07-15 RX ADMIN — CARVEDILOL 12.5 MG: 12.5 TABLET, FILM COATED ORAL at 22:41

## 2024-07-15 RX ADMIN — OXYCODONE 2.5 MG: 5 TABLET ORAL at 22:37

## 2024-07-15 RX ADMIN — DEXTROSE MONOHYDRATE 250 ML: 100 INJECTION, SOLUTION INTRAVENOUS at 16:23

## 2024-07-15 RX ADMIN — ACETAMINOPHEN 1000 MG: 500 TABLET ORAL at 13:56

## 2024-07-15 RX ADMIN — SODIUM CHLORIDE, PRESERVATIVE FREE 10 ML: 5 INJECTION INTRAVENOUS at 22:39

## 2024-07-15 RX ADMIN — ASPIRIN 325 MG: 325 TABLET, FILM COATED ORAL at 10:11

## 2024-07-15 RX ADMIN — TROSPIUM CHLORIDE 20 MG: 20 TABLET, FILM COATED ORAL at 22:39

## 2024-07-15 RX ADMIN — INSULIN GLARGINE 22 UNITS: 100 INJECTION, SOLUTION SUBCUTANEOUS at 10:11

## 2024-07-15 RX ADMIN — Medication 16 G: at 19:43

## 2024-07-15 RX ADMIN — OXYCODONE 2.5 MG: 5 TABLET ORAL at 05:53

## 2024-07-15 RX ADMIN — POTASSIUM CHLORIDE, DEXTROSE MONOHYDRATE AND SODIUM CHLORIDE: 150; 5; 900 INJECTION, SOLUTION INTRAVENOUS at 23:17

## 2024-07-15 RX ADMIN — SERTRALINE HYDROCHLORIDE 50 MG: 50 TABLET ORAL at 10:11

## 2024-07-15 RX ADMIN — HEPARIN SODIUM 5000 UNITS: 5000 INJECTION INTRAVENOUS; SUBCUTANEOUS at 13:56

## 2024-07-15 RX ADMIN — ATORVASTATIN CALCIUM 80 MG: 80 TABLET, FILM COATED ORAL at 10:11

## 2024-07-15 RX ADMIN — INSULIN LISPRO 8 UNITS: 100 INJECTION, SOLUTION INTRAVENOUS; SUBCUTANEOUS at 13:51

## 2024-07-15 RX ADMIN — GLIPIZIDE 10 MG: 10 TABLET ORAL at 10:11

## 2024-07-15 RX ADMIN — HEPARIN SODIUM 5000 UNITS: 5000 INJECTION INTRAVENOUS; SUBCUTANEOUS at 22:41

## 2024-07-15 RX ADMIN — OXYCODONE 2.5 MG: 5 TABLET ORAL at 10:16

## 2024-07-15 ASSESSMENT — PAIN DESCRIPTION - LOCATION
LOCATION: BACK

## 2024-07-15 ASSESSMENT — PAIN SCALES - GENERAL
PAINLEVEL_OUTOF10: 2
PAINLEVEL_OUTOF10: 10
PAINLEVEL_OUTOF10: 9
PAINLEVEL_OUTOF10: 3
PAINLEVEL_OUTOF10: 4
PAINLEVEL_OUTOF10: 8

## 2024-07-15 ASSESSMENT — PAIN DESCRIPTION - DESCRIPTORS
DESCRIPTORS: ACHING;SORE
DESCRIPTORS: THROBBING
DESCRIPTORS: ACHING;SORE
DESCRIPTORS: ACHING;THROBBING;DISCOMFORT

## 2024-07-15 ASSESSMENT — PAIN DESCRIPTION - ORIENTATION
ORIENTATION: LOWER

## 2024-07-15 NOTE — PLAN OF CARE
Problem: Safety - Adult  Goal: Free from fall injury  7/15/2024 1541 by Mecca Cornejo RN  Outcome: Progressing  7/15/2024 0338 by Ramsey Lagos RN  Outcome: Progressing     Problem: Discharge Planning  Goal: Discharge to home or other facility with appropriate resources  7/15/2024 1541 by Mecca Cornejo RN  Outcome: Progressing  7/15/2024 0338 by Ramsey Lagos RN  Outcome: Progressing     Problem: Pain  Goal: Verbalizes/displays adequate comfort level or baseline comfort level  7/15/2024 1541 by Mecca Cornejo RN  Outcome: Progressing  7/15/2024 0338 by Ramsey Lagos RN  Outcome: Progressing     Problem: Chronic Conditions and Co-morbidities  Goal: Patient's chronic conditions and co-morbidity symptoms are monitored and maintained or improved  Outcome: Progressing     Problem: Physical Therapy - Adult  Goal: By Discharge: Performs mobility at highest level of function for planned discharge setting.  See evaluation for individualized goals.  7/15/2024 0919 by Argenis Pagan, PT  Outcome: Progressing  7/15/2024 0919 by Argenis Pagan, PT  Outcome: Progressing

## 2024-07-15 NOTE — CARE COORDINATION
SBIRT complete, screening was negative.  Patients daughter at bedside, pt agreeable to her staying for conversation.  Daughter states that pt was diagnosed with dementia this past year. Pt was able to complete assessment with writer.  She denies alcohol/drug use.  She does have a hx of depression.  Daughter states she had a suicide attempt 9 years ago and depression has been a lifelong issue.  She currently is on medication, prescribed by his PCP.  Pt denies concerns with depression at this time and denies SI.  Daughter states that they have been concerned about her living on her own for awhile and assisting her with grocery shopping and paying bills.  They have applied for medicaid and services through the [facility] office on aging.  Plan at discharge is SNF placement.              Alcohol Screening and Brief Intervention        No results for input(s): \"ALC\" in the last 72 hours.    Alcohol Pre-screening     (WOMEN ONLY) How many times in the past year have you had 4 or more drinks in a day?: None       Drug Pre-Screening -none       Drug Screening DAST       Mood Pre-Screening (PHQ-2)  During the past 2 weeks, have you been bothered by, feeling down, depressed or hopeless?  No        I have interviewed Ana Domingo, [id number] regarding  Her alcohol consumption/drug use and risk for excessive use. Screenings were negative.  Patient  N/A intervention at this time.     Deferred []    Completed on: 7/15/2024   FROYLAN [place]

## 2024-07-15 NOTE — CARE COORDINATION
DISCHARGE PLANNING EVALUATION: OP/OBSERVATION        7/15/24, 12:20 PM EDT    Ana Domingo         Location: 0248/0248-01   Reason for hospitalization: Lumbar burst fracture, closed, initial encounter ([facility]) [S32.001A]     CM Services requested for transitional needs.     PCP: Destiny Sauceda MD    Transportation/Food Security/Housing Addressed:  No issues identified.     Equipment needs: none    Case Management Services Information Letter Provided [x]    Transition plan:  Spoke with patient and pt's daughter Nelida at bedside, Nelida states they have been working to get more help for patient at home due to her dementia. They have applied for Passport and were supposed to meet with [facility] care yesterday. CM explains that insurance will not cover 24/7 home health aides, patient would have to private pay for HHA 24/7. Pt's daughter states they are unable to do that. Pt's daughter would like a referral sent to [facility] as patient needs help with putting LSO brace on, dressing her self and needs more therapy.    Referral sent to [facility].

## 2024-07-15 NOTE — PLAN OF CARE
Patient in no apparent distress at this time.  No falls or new injuries noted. Complaints of pain addressed with PRN meds.  Call light left within reach.

## 2024-07-16 VITALS
SYSTOLIC BLOOD PRESSURE: 123 MMHG | TEMPERATURE: 97.4 F | HEART RATE: 64 BPM | RESPIRATION RATE: 15 BRPM | DIASTOLIC BLOOD PRESSURE: 101 MMHG | OXYGEN SATURATION: 100 %

## 2024-07-16 PROBLEM — S32.000A CLOSED COMPRESSION FRACTURE OF BODY OF LUMBAR VERTEBRA (HCC): Status: ACTIVE | Noted: 2024-07-16

## 2024-07-16 LAB
ANION GAP SERPL CALCULATED.3IONS-SCNC: 10 MMOL/L (ref 9–16)
BUN SERPL-MCNC: 29 MG/DL (ref 8–23)
CALCIUM SERPL-MCNC: 9.2 MG/DL (ref 8.6–10.4)
CHLORIDE SERPL-SCNC: 105 MMOL/L (ref 98–107)
CO2 SERPL-SCNC: 22 MMOL/L (ref 20–31)
CREAT SERPL-MCNC: 1.2 MG/DL (ref 0.5–0.9)
ERYTHROCYTE [DISTWIDTH] IN BLOOD BY AUTOMATED COUNT: 14.2 % (ref 11.8–14.4)
GFR, ESTIMATED: 48 ML/MIN/1.73M2
GLUCOSE BLD-MCNC: 153 MG/DL (ref 65–105)
GLUCOSE BLD-MCNC: 207 MG/DL (ref 65–105)
GLUCOSE SERPL-MCNC: 215 MG/DL (ref 74–99)
HCT VFR BLD AUTO: 30.7 % (ref 36.3–47.1)
HGB BLD-MCNC: 9.3 G/DL (ref 11.9–15.1)
MCH RBC QN AUTO: 23.6 PG (ref 25.2–33.5)
MCHC RBC AUTO-ENTMCNC: 30.3 G/DL (ref 28.4–34.8)
MCV RBC AUTO: 77.9 FL (ref 82.6–102.9)
NRBC BLD-RTO: 0 PER 100 WBC
PLATELET # BLD AUTO: 215 K/UL (ref 138–453)
PMV BLD AUTO: 9.8 FL (ref 8.1–13.5)
POTASSIUM SERPL-SCNC: 4 MMOL/L (ref 3.7–5.3)
RBC # BLD AUTO: 3.94 M/UL (ref 3.95–5.11)
SODIUM SERPL-SCNC: 137 MMOL/L (ref 136–145)
WBC OTHER # BLD: 7 K/UL (ref 3.5–11.3)

## 2024-07-16 PROCEDURE — 6360000002 HC RX W HCPCS: Performed by: STUDENT IN AN ORGANIZED HEALTH CARE EDUCATION/TRAINING PROGRAM

## 2024-07-16 PROCEDURE — 97530 THERAPEUTIC ACTIVITIES: CPT

## 2024-07-16 PROCEDURE — 82947 ASSAY GLUCOSE BLOOD QUANT: CPT

## 2024-07-16 PROCEDURE — G0378 HOSPITAL OBSERVATION PER HR: HCPCS

## 2024-07-16 PROCEDURE — 2580000003 HC RX 258

## 2024-07-16 PROCEDURE — 6370000000 HC RX 637 (ALT 250 FOR IP)

## 2024-07-16 PROCEDURE — 6370000000 HC RX 637 (ALT 250 FOR IP): Performed by: STUDENT IN AN ORGANIZED HEALTH CARE EDUCATION/TRAINING PROGRAM

## 2024-07-16 PROCEDURE — 97129 THER IVNTJ 1ST 15 MIN: CPT

## 2024-07-16 PROCEDURE — 80048 BASIC METABOLIC PNL TOTAL CA: CPT

## 2024-07-16 PROCEDURE — 97130 THER IVNTJ EA ADDL 15 MIN: CPT

## 2024-07-16 PROCEDURE — 97535 SELF CARE MNGMENT TRAINING: CPT

## 2024-07-16 PROCEDURE — 36415 COLL VENOUS BLD VENIPUNCTURE: CPT

## 2024-07-16 PROCEDURE — 85027 COMPLETE CBC AUTOMATED: CPT

## 2024-07-16 PROCEDURE — 1200000000 HC SEMI PRIVATE

## 2024-07-16 RX ORDER — DOCUSATE SODIUM 100 MG/1
100 CAPSULE, LIQUID FILLED ORAL DAILY
Status: DISCONTINUED | OUTPATIENT
Start: 2024-07-16 | End: 2024-07-16 | Stop reason: HOSPADM

## 2024-07-16 RX ORDER — OXYCODONE HYDROCHLORIDE 5 MG/1
2.5 TABLET ORAL EVERY 8 HOURS PRN
Qty: 8 TABLET | Refills: 0 | Status: SHIPPED | OUTPATIENT
Start: 2024-07-16 | End: 2024-07-21

## 2024-07-16 RX ORDER — QUETIAPINE FUMARATE 25 MG/1
25 TABLET, FILM COATED ORAL ONCE
Status: COMPLETED | OUTPATIENT
Start: 2024-07-16 | End: 2024-07-16

## 2024-07-16 RX ORDER — PSEUDOEPHEDRINE HCL 30 MG
100 TABLET ORAL DAILY
Qty: 30 CAPSULE | Refills: 0 | Status: SHIPPED | OUTPATIENT
Start: 2024-07-17 | End: 2024-08-16

## 2024-07-16 RX ADMIN — ATORVASTATIN CALCIUM 80 MG: 80 TABLET, FILM COATED ORAL at 10:28

## 2024-07-16 RX ADMIN — OXYCODONE 2.5 MG: 5 TABLET ORAL at 06:26

## 2024-07-16 RX ADMIN — CARVEDILOL 12.5 MG: 12.5 TABLET, FILM COATED ORAL at 10:28

## 2024-07-16 RX ADMIN — DOCUSATE SODIUM 100 MG: 100 CAPSULE, LIQUID FILLED ORAL at 10:28

## 2024-07-16 RX ADMIN — HEPARIN SODIUM 5000 UNITS: 5000 INJECTION INTRAVENOUS; SUBCUTANEOUS at 06:14

## 2024-07-16 RX ADMIN — SERTRALINE HYDROCHLORIDE 50 MG: 50 TABLET ORAL at 10:28

## 2024-07-16 RX ADMIN — QUETIAPINE FUMARATE 25 MG: 25 TABLET ORAL at 00:37

## 2024-07-16 RX ADMIN — GLIPIZIDE 10 MG: 10 TABLET ORAL at 10:30

## 2024-07-16 RX ADMIN — SODIUM CHLORIDE, PRESERVATIVE FREE 10 ML: 5 INJECTION INTRAVENOUS at 10:29

## 2024-07-16 RX ADMIN — ASPIRIN 325 MG: 325 TABLET, FILM COATED ORAL at 12:13

## 2024-07-16 RX ADMIN — ACETAMINOPHEN 1000 MG: 500 TABLET ORAL at 06:26

## 2024-07-16 RX ADMIN — LISINOPRIL 20 MG: 20 TABLET ORAL at 10:28

## 2024-07-16 ASSESSMENT — PAIN SCALES - GENERAL
PAINLEVEL_OUTOF10: 4
PAINLEVEL_OUTOF10: 7

## 2024-07-16 ASSESSMENT — PAIN DESCRIPTION - LOCATION: LOCATION: BACK

## 2024-07-16 NOTE — DISCHARGE INSTRUCTIONS
Discharge Instructions for Trauma       What to do after you leave the hospital:      Please continue to use your Incentive Spirometer as directed.  You can practice 10 deep breaths/hour while awake.   Using the Incentive Spirometer will promote the health of your lungs by taking slow, deep breaths in.  It is also important in preventing pneumonia or a pneumothorax from developing.     Take all medications as directed.  Wear the brace at all times when out of bed.  You may take it off while in bed and for hygiene/showering.  Follow-up with the neurosurgery team in 6 weeks.      For resources transitioning back to the community following your trauma, visit [email or web address]    General questions or concerns please call the [facility] at [phone].  If needed, the clinic fax number is [phone].    Trauma is a life-threatening condition. Your doctor will want to closely monitor you. Be sure to go to all of your appointments.

## 2024-07-16 NOTE — PLAN OF CARE
Problem: Safety - Adult  Goal: Free from fall injury  7/16/2024 0253 by Pham Weinstein RN  Outcome: Progressing  7/15/2024 1541 by Mecca Cornejo RN  Outcome: Progressing     Problem: Discharge Planning  Goal: Discharge to home or other facility with appropriate resources  7/16/2024 0253 by Pham Weinstein RN  Outcome: Progressing  7/15/2024 1541 by Mecca Cornejo RN  Outcome: Progressing     Problem: Pain  Goal: Verbalizes/displays adequate comfort level or baseline comfort level  7/16/2024 0253 by Pham Weinstein RN  Outcome: Progressing  7/15/2024 1541 by Mecca Cornejo RN  Outcome: Progressing     Problem: Chronic Conditions and Co-morbidities  Goal: Patient's chronic conditions and co-morbidity symptoms are monitored and maintained or improved  7/16/2024 0253 by Pham Weinstein RN  Outcome: Progressing  7/15/2024 1541 by Mecca Cornejo RN  Outcome: Progressing

## 2024-07-16 NOTE — DISCHARGE INSTR - COC
support    Rehab Therapies: Physical Therapy, Occupational Therapy, and Orthotics/Prosthetics  Weight Bearing Status/Restrictions: No weight bearing restrictions  Other Medical Equipment (for information only, NOT a DME order):  walker and bath bench  Other Treatments: LSO brace when up and ambulating    Patient's personal belongings (please select all that are sent with patient):  {[facility] DME Belongings:[id number]}    RN SIGNATURE:  Electronically signed by Mecca Cornejo RN on 7/16/24 at 12:07 PM EDT    CASE MANAGEMENT/SOCIAL WORK SECTION    Inpatient Status Date: 0713/24    Readmission Risk Assessment Score:  Readmission Risk              Risk of Unplanned Readmission:  11           Discharging to Facility/ Agency   Name: [facility]  Address: [address] [place] [id number]  Phone:   Fax:    Dialysis Facility (if applicable)   Name:  Address:  Dialysis Schedule:  Phone:  Fax:    / signature: Electronically signed by Delia Mitchell on 7/16/24 at 11:23 AM EDT    PHYSICIAN SECTION    Prognosis: Fair    Condition at Discharge: Stable    Rehab Potential (if transferring to Rehab): Good    Recommended Labs or Other Treatments After Discharge: Continued PT with LSO brace, f/u with PCP for diabetic management.    Physician Certification: I certify the above information and transfer of Ana Domingo  is necessary for the continuing treatment of the diagnosis listed and that she requires Skilled Nursing Facility for 30 days.     Update Admission H&P: No change in H&P    PHYSICIAN SIGNATURE:  Electronically signed by Anthony Godfrey MD on 7/16/24 at 1:10 PM EDT

## 2024-07-16 NOTE — CARE COORDINATION
Transitional Planning: Call to [facility] @ [facility] and can accept patient. States is able to take patient today whenever can arrange for transport. Do not need HENS and will fax AVS as soon as completed. Patient and daughter Nelida made aware of plan to transfer today and will call back when time arranged for transport.     10:15 AM Transport request faxed to [facility] for stretcher transport.     10:30 AM Call from Nik[facility] @ [facility]. Patient and daughter Nelida made aware of admission to facility today and 1PM . Daughter will meet patient at facility.    12:00 PM Faxed AVS to [facility] and made [facility] aware of 1 PM .    Discharge Report    [facility]  Clinical Case Management Department  Written by: Delia Mitchell    Patient Name: Ana Domingo  Attending Provider: No att. providers found  Admit Date: 2024  9:24 PM  MRN: [id number]  Account: [id number]                     : 1945  Discharge Date: 2024      Disposition: [facility] and transport by stretcher    Delia Mitchell, RN/CM

## 2024-07-16 NOTE — DISCHARGE SUMMARY
BONES/ALIGNMENT:  Diffuse bony demineralization.  Acute appearing L1 inferior endplate compression deformity appearing isolated to the anterior column. Subacute appearing L2 superior endplate burst deformity with subjacent sclerosis, mild to moderate height loss most prominent centrally, and displacement of posterior wall fragments by up to 0.8 cm with resultant mild spinal canal stenosis and severe narrowing of the right lateral recess. Straightening of lumbar lordosis.  No spondylolisthesis. DEGENERATIVE CHANGES:  Mild to severe degenerative disc disease most severe at L4/L5.  Mild to severe facet arthropathy. SOFT TISSUES:  Minimal perivertebral blood products about the L1/L2 intervertebral disc.  Moderate systemic atherosclerotic calcifications.     1. Subacute appearing L2 superior endplate burst fracture as above, a potentially unstable fracture.  Critical results were called by Dr. Mat Babin MD to Dr. Marjan Leon on 7/13/2024 at 23:50. 2. Acute appearing L1 inferior endplate compression fracture. 3. Bony demineralization.             DISCHARGE INSTRUCTIONS     Discharge Medications:        Medication List        START taking these medications      docusate 100 MG Caps  Commonly known as: COLACE, DULCOLAX  Take 100 mg by mouth daily  Start taking on: July 17, 2024     oxyCODONE 5 MG immediate release tablet  Commonly known as: ROXICODONE  Take 0.5 tablets by mouth every 8 hours as needed for Pain for up to 5 days. Max Daily Amount: 7.5 mg            CONTINUE taking these medications      Arthritis Pain Relief 650 MG extended release tablet  Generic drug: acetaminophen     aspirin 325 MG Tabs  Commonly known as: ASCRIPTIN  Take 1 tablet by mouth daily     atorvastatin 80 MG tablet  Commonly known as: LIPITOR     carvedilol 25 MG tablet  Commonly known as: COREG     FLUoxetine 40 MG capsule  Commonly known as: PROzac  Take 1 capsule by mouth daily     glimepiride 4 MG tablet  Commonly known as:

## 2024-07-16 NOTE — PROGRESS NOTES
PROGRESS NOTE          PATIENT NAME: Ana Domingo  MEDICAL RECORD NO. [id number]  DATE: 2024  SURGEON: Emil  PRIMARY CARE PHYSICIAN: Destiny Sauceda MD    HD: # 0    ASSESSMENT    Patient Active Problem List   Diagnosis    Hyperlipidemia    Diabetes mellitus (HCC)    Confusion caused by a drug    Intentional drug overdose (HCC)    Coronary artery disease involving native coronary artery of native heart without angina pectoris    HTN (hypertension), benign    Environmental allergies    Suicidal deliberate poisoning (HCC)    Severe recurrent major depression without psychotic features (HCC)    Pressure ulcer of foot    Diabetes mellitus due to underlying condition with foot ulcer (HCC)    Closed stable burst fracture of second lumbar vertebra (HCC)       MEDICAL DECISION MAKING AND PLAN      Trauma  CC: FFSH 10 days ago    Injuries: L2 fracture, L1 superior endplate fracture     L1/2 fx  Neurosurgery consulted  MRI L-spine ordered which shows a subacute L2 burst fracture with moderate height loss, and a subacute L1 endplate fracture with minimal height loss, disc extrusion L5-S1 impinging L5 nerve root  Neurosurgery recommending LSO brace.  If the brace does not help during PT/OT, will consider surgery.    Miscellaneous  Diet: Regular  Pain: MMT  Encourage I-S, deep breathing, coughing  Dispo: Follow-up final neurosurgery recommendations    Chief Complaint: \"My back aches a bit\"    SUBJECTIVE    The patient was seen examined at bedside, no overnight events.  Patient reports her back aches slightly when she is sitting up.  She is sitting up in bed this morning eating breakfast.  Unmeasured UOP.  Pain controlled.  VSS, afebrile.      OBJECTIVE  VITALS: Temp: Temp: 98 °F (36.7 °C)Temp  Av.5 °F (36.9 °C)  Min: 98 °F (36.7 °C)  Max: 98.8 °F (37.1 °C) BP Systolic (24hrs), Av , Min:153 , Max:162   Diastolic (24hrs), Av, Min:70, Max:86   Pulse Pulse  Av.3  Min: 69  Max: 78 Resp Resp  Avg: 
    PROGRESS NOTE          PATIENT NAME: Ana Domingo  MEDICAL RECORD NO. 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  If the brace does not help during PT/OT, will consider surgery. Otherwise, FU 6wks    Miscellaneous  Diet: Regular  Pain: MMT  Encourage I-S, deep breathing, coughing  Restarted home meds  Dispo: OT/PT with LSO, d/c planning.     Chief Complaint: \"My back aches a bit\"    SUBJECTIVE    The patient was seen examined at bedside, no overnight events.  Patient reports her back aches this AM. Is wearing her LSO sitting at the side of the bed. Tolerating diet. Good UOP. VSS, afebrile.       OBJECTIVE  VITALS: Temp: Temp: 98.1 °F (36.7 °C)Temp  Av.3 °F (36.8 °C)  Min: 98.1 °F (36.7 °C)  Max: 98.7 °F (37.1 °C) BP Systolic (24hrs), Av , Min:112 , Max:183   Diastolic (24hrs), Av, Min:72, Max:101   Pulse Pulse  Av  Min: 72  Max: 79 Resp Resp  Av.8  Min: 
    PROGRESS NOTE      PATIENT NAME: Ana Domingo  MEDICAL RECORD NO. 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  If the brace does not help during PT/OT, will consider surgery. Otherwise, FU 6wks  Ambulated 100ft with RW and PT yesterday    Miscellaneous  Diet: Regular  Pain: MMT  Encourage I-S, deep breathing, coughing  Restarted home meds  Dispo: Medically stable for discharge, planning SNF.    Chief Complaint: \"I'm tired\"    SUBJECTIVE    The patient was seen examined at bedside, no overnight events.  Patient ambulated 100 feet with a rolling walker and PT yesterday.  Tolerating regular diet.  VSS, afebrile.      OBJECTIVE  VITALS: Temp: Temp: 98 °F (36.7 °C)Temp  Av.1 °F (36.7 °C)  Min: 97.8 °F (36.6 °C)  Max: 98.4 °F (36.9 °C) BP Systolic (24hrs), Av , Min:142 , Max:170   Diastolic (24hrs), Av, Min:56, Max:86   Pulse Pulse  Av.5  Min: 66  Max: 71 Resp Resp  
15 Variable Trauma-Specific Frailty Index   Comorbidities   Cancer History Yes (1) No (0)   Coronary Heart Disease MI (1) CABG (0.75) Mild (0.25)  No (0)   Dementia Severe (1) Moderate (0.5) Mild (0.25)  No (0)   Daily Activities   Help With Grooming Yes (1) No (0)   Help with Managing Money Yes (1) No (0)   Help doing Housework Yes (1) No (0)   Help with Toileting Yes (1) No (0)   Help Walking Wheelchair (1) Walker (0.75) Cane (0.5) No (0)   Health Attitude   Feel Less Useful Most Time (1) Sometimes (0.5) Never (0)   Feel Sad Most Time (1) Sometimes (0.5) Never (0)   Feel Effort to Do Everything Most Time (1) Sometimes (0.5) Never (0)   Feels Lonely Most Time (1) Sometimes (0.5) Never (0)   Falls Most Time (1) Sometimes (0.5) Never (0)   Function   Sexually Active Yes (0)  No(1)   Nutrition   Albumin < 3g/dL (1)  > 3g/dL   Scoring   Score   FI (Score/15)   > 0.25 = Frail   *Based on 2-weeks prior to hospital admission   Trauma Specific Fraility Index > or = to 4 (4/15 = 0.26)  Trauma Specific Fraility Index Score:    5/15=0.33 = FRAIL  
Detail Level: Simple
Occupational Therapy    [facility]  Occupational Therapy Not Seen Note    DATE: 2024    NAME: Ana Domingo  MRN: [id number]   : 1945      Patient not seen this date for Occupational Therapy due to:    Strict Bedrest: Awaiting Neurosx decisions at this time.    Next Scheduled Treatment: Attempt in pm or 7/15 as appropriate.    Electronically signed by Justino Arshad OT on 2024 at 7:34 AM    
Occupational Therapy  Facility/Department: [address] ORTHO/MED SURG  Occupational Therapy Daily Treatment Note    Name: Ana Domingo  : 1945  MRN: [id number]  Date of Service: 2024    Discharge Recommendations:  Patient would benefit from continued therapy after discharge          Patient Diagnosis(es): The encounter diagnosis was Closed stable burst fracture of second lumbar vertebra, initial encounter (HCC).  Past Medical History:  has a past medical history of Anxiety, Cellulitis, Depression, Diabetes mellitus (HCC), History of blood transfusion, Hyperlipidemia, Hypertension, and Myocardial infarct (HCC).  Past Surgical History:  has a past surgical history that includes Cholecystectomy; Hysterectomy; Tonsillectomy; knee surgery (Left); Appendectomy; Vulva surgery (2017); Colonoscopy; Endoscopy, colon, diagnostic; other surgical history (Left, 09/10/2019); and Abdomen surgery (Left, 9/10/2019).           Assessment   Performance deficits / Impairments: Decreased functional mobility ;Decreased ADL status;Decreased safe awareness;Decreased endurance;Decreased balance;Decreased high-level IADLs;Decreased posture;Decreased strength  Assessment: Pt limited by above deficits impacting pts functional mobility/ADL performance. Pt is expected to require skilled OT services to increase safety and promote increased independence t/o ADL/IADLs and functional mobility tasks.  Prognosis: Good  Activity Tolerance  Activity Tolerance: Patient Tolerated treatment well;Treatment limited secondary to decreased cognition        Plan   Occupational Therapy Plan  Times Per Week: 5-6/wk  Times Per Day: Once a day  Current Treatment Recommendations: Functional mobility training, Endurance training, Cognitive reorientation, Safety education & training, Patient/Caregiver education & training, Equipment evaluation, education, & procurement, Positioning, Self-Care / ADL, Home management training, Cognitive/Perceptual training 
Physical Therapy  Facility/Department: [address] ORTHO/MED SURG  Physical Therapy Initial Assessment    Name: Ana Domingo  : 1945  MRN: [id number]  Date of Service: 7/15/2024    Discharge Recommendations:  Therapy recommended at discharge   PT Equipment Recommendations  Equipment Needed: No      Patient Diagnosis(es): The encounter diagnosis was Closed stable burst fracture of second lumbar vertebra, initial encounter (HCC).  Past Medical History:  has a past medical history of Anxiety, Cellulitis, Depression, Diabetes mellitus (HCC), History of blood transfusion, Hyperlipidemia, Hypertension, and Myocardial infarct (HCC).  Past Surgical History:  has a past surgical history that includes Cholecystectomy; Hysterectomy; Tonsillectomy; knee surgery (Left); Appendectomy; Vulva surgery (2017); Colonoscopy; Endoscopy, colon, diagnostic; other surgical history (Left, 09/10/2019); and Abdomen surgery (Left, 9/10/2019).    Assessment   Body Structures, Functions, Activity Limitations Requiring Skilled Therapeutic Intervention: Decreased functional mobility ;Decreased strength;Decreased safe awareness;Decreased endurance;Decreased balance  Assessment: Pt able to ambulate 100 ft with rw, CGA for safety.  Pt Mary for transfers, Mary for bed mobility. Pt is unsafe to return to previous living siutation due to cognition and high fall risk. Pt will benefit from continued therapy to improve deficits and progress function.  Therapy Prognosis: Good  Decision Making: Medium Complexity  Requires PT Follow-Up: Yes  Activity Tolerance  Activity Tolerance: Treatment limited secondary to decreased cognition  Activity Tolerance Comments: no carryover with orientation or education on brace, safety     Plan   Physical Therapy Plan  General Plan:  (5-6x/week)  Current Treatment Recommendations: Strengthening, Balance training, Functional mobility training, Transfer training, Endurance training, Stair training, Gait training, 
Pt blood sugar dropped to 36. PRN bolus given. Blood sugar now at 160. Primary notified.   
Pt discharged to [place] via transport with all belongings. Report called with all questions answered at this time.   
Pt voided 200ml for dayshift, Bladder scan shows nothing. RN encouraged more oral intake from patient. Blood sugar at this time is 61. PRN oral replacement as well as boxed lunch provided. Primary notified of low output and sugar. Orders received.   
SLP ALL NOTES  Facility/Department: [address] ORTHO/MED SURG  Initial Speech/Language/Cognitive Assessment    NAME: Ana Domingo  : 1945   MRN: [id number]  ADMISSION DATE: 2024  ADMITTING DIAGNOSIS: has Hyperlipidemia; Diabetes mellitus (HCC); Confusion caused by a drug; Intentional drug overdose (HCC); Coronary artery disease involving native coronary artery of native heart without angina pectoris; HTN (hypertension), benign; Environmental allergies; Suicidal deliberate poisoning (HCC); Severe recurrent major depression without psychotic features (HCC); Pressure ulcer of foot; Diabetes mellitus due to underlying condition with foot ulcer (HCC); and Closed stable burst fracture of second lumbar vertebra (HCC) on their problem list.      Date of Eval: 7/15/2024   Evaluating Therapist: DAVID COFFMAN    RECENT RESULTS  CT OF HEAD/MRI: N/A    Primary Complaint:   Ana Domingo is a female that presented to the Emergency Department following a fall approximately 2 weeks ago. She has dementia and is confused at baseline, Daughter states no one witnessed the fall so they are unsure of what happened, a passer by heard her screaming and called 911. EMS did not take her to the hospital, she follow up outpatient with her PCP who ordered xrays.  The patient was unable to get these done.  Her pain slowly worsened until her family brought her in today. She is not on AC, unknown if she hit her head or lost consciousness.     Pain:  Pain Assessment  Pain Assessment: 0-10  Pain Level: 2  Shirley-Baker Pain Rating: No hurt  Patient's Stated Pain Goal: 0 - No pain  Pain Location: Back  Pain Orientation: Lower  Pain Descriptors: Aching, Sore  Response to Pain Intervention: Patient satisfied  Side Effects: No reported side effects    Vision/ Hearing  Vision  Vision: Impaired  Vision Exceptions: Wears glasses at all times  Hearing  Hearing: Within functional limits    Assessment:      Pt presents with moderate cognitive deficits 
[facility]  Speech Language Pathology    Date: 7/14/2024  Patient Name: Ana Domingo  YOB: 1945   AGE: 78 y.o.  MRN: [id number]        Patient Not Available for Speech Therapy     Due to:  [x] Testing  [] Hemodialysis  [] Cancelled by RN  [] Surgery   [] Intubation/Sedation/Pain Medication  [] Medical instability  [] Other:    Next scheduled treatment: 7/15/24  Completed by: Tawnya Fox, SLP    
Speech Language Pathology  [facility]    Cognitive Treatment Note    Date: 2024  Patient’s Name: Ana Domingo  MRN: [id number]    Patient Active Problem List   Diagnosis Code    Hyperlipidemia E78.5    Diabetes mellitus (HCC) E11.9    Confusion caused by a drug R41.0, T50.905A    Intentional drug overdose ([facility]) T50.902A    Coronary artery disease involving native coronary artery of native heart without angina pectoris I25.10    HTN (hypertension), benign I10    Environmental allergies Z91.09    Suicidal deliberate poisoning ([facility]) T65.92XA    Severe recurrent major depression without psychotic features ([facility]) F33.2    Pressure ulcer of foot L89.899    Diabetes mellitus due to underlying condition with foot ulcer ([facility]) E08.621, L97.509    Closed stable burst fracture of second lumbar vertebra ([facility]) S32.021A    Closed compression fracture of body of lumbar vertebra ([facility]) S32.000A       Pain: 0/10    Cognitive Treatment    Treatment time: [id number]      Subjective: [x] Alert [x] Cooperative     [] Confused     [] Agitated    [] Lethargic      Objective/Assessment:    Orientation:   Pt. Oriented to name, age, , place, and was oriented to year and month with min verbal cues.    Recall:   Delayed recall, associated 0/3 increased to 3/3 with min verbal cues, 0/3 increased to 3/3 with mod verbal cues, 0/3 increased to 3/3 with min verbal cues  Word List Retention: Category Inclusion 9/10 increased to 10/10 with repetition    Problem Solving/Reasoning:   Category Members: [place] 3/6 increased to 6/6 with mod verbal cues, 4/6 increased to 6/6 with mod verbal cues, 5/6 increased to 6/6 with min verbal cues  Add to the Category: Abstract 10/10 independently  Wrong Category: [place] 8/8 independently  Opposite 8/8 independently  Word Deductions 10/10 independently        Plan:  [x] Continue ST services    [] Discharge from ST:      Discharge recommendations: []  Further therapy recommended at 
  Neurosurgery  Orthotist/Prosthetist    PROCEDURES: none    [x] Reviewed radiology reports  MRI LUMBAR SPINE WO CONTRAST    Result Date: 7/14/2024  1. Subacute L2 burst fracture with mild to moderate central height loss and 7-8 mm retropulsion of the superior endplate. 2. Acute to subacute L1 inferior endplate compression fracture with minimal height loss. 3. Right foraminal/extraforaminal disc extrusion at L5-S1 impinging the exiting right L5 nerve root. 4. 9 mm T2 hyperintense lesion adjacent to the right L3 lamina within the paraspinal soft tissues, likely reflecting a synovial cyst.     CT LUMBAR SPINE WO CONTRAST    Result Date: 7/13/2024  1. Subacute appearing L2 superior endplate burst fracture as above, a potentially unstable fracture.  Critical results were called by Dr. Mat Babin MD to Dr. Marjan Leon on 7/13/2024 at 23:50. 2. Acute appearing L1 inferior endplate compression fracture. 3. Bony demineralization.        Assessment/Plan:     Negative tertiary exam. Planning DC tomorrow.    
administration of intravenous contrast. Multiplanar reformatted images are provided for review. Adjustment of mA and/or kV according to patient size was utilized.  Automated exposure control, iterative reconstruction, and/or weight based adjustment of the mA/kV was utilized to reduce the radiation dose to as low as reasonably achievable. COMPARISON: Abdomen and pelvis CT 07/09/2012 HISTORY: ORDERING SYSTEM PROVIDED HISTORY: fall, lumbar back pain TECHNOLOGIST PROVIDED HISTORY: fall, lumbar back pain Decision Support Exception - unselect if not a suspected or confirmed emergency medical condition->Emergency Medical Condition (MA) FINDINGS: BONES/ALIGNMENT:  Diffuse bony demineralization.  Acute appearing L1 inferior endplate compression deformity appearing isolated to the anterior column. Subacute appearing L2 superior endplate burst deformity with subjacent sclerosis, mild to moderate height loss most prominent centrally, and displacement of posterior wall fragments by up to 0.8 cm with resultant mild spinal canal stenosis and severe narrowing of the right lateral recess. Straightening of lumbar lordosis.  No spondylolisthesis. DEGENERATIVE CHANGES:  Mild to severe degenerative disc disease most severe at L4/L5.  Mild to severe facet arthropathy. SOFT TISSUES:  Minimal perivertebral blood products about the L1/L2 intervertebral disc.  Moderate systemic atherosclerotic calcifications.     1. Subacute appearing L2 superior endplate burst fracture as above, a potentially unstable fracture.  Critical results were called by Dr. Mat Babin MD to Dr. Marjan Leon on 7/13/2024 at 23:50. 2. Acute appearing L1 inferior endplate compression fracture. 3. Bony demineralization.          A/P  78 y.o. female who presents with L2 burst fracture and L1 inferior endplate compression fracture.      - Neurosurgical intervention pending once reviewed by attending neurosurgeon    - LSO brace when OOB   - F/U outpatient in 
with injury in the past year?: Yes  Receives Help From: Family  ADL Assistance: Independent  Homemaking Assistance: Needs assistance  Homemaking Responsibilities: Yes  Ambulation Assistance: Independent  Transfer Assistance: Independent  Active : No  Patient's  Info: family  Occupation: Retired  Type of Occupation: Pt has been retired for a while, no report of previous career.  Leisure & Hobbies: Pt enjoys doing crafts  Additional Comments: Pt reports that she lives at home alone. She was grossly Mod I prior to admission. She reports that she has a pill container where meds are setup for her. She completes basic cooking, has someone that assist with cleaning, driving. Pt reports that she is grossly Mod I in all ADL and ADL mobility. 2 falls reported in the last year. She has a dtr that lives in town and stops in to check on her. Pt reports having a life alert but unsure of accuracy of this. Pt does report that she has some baseline memory impairments but that she has been in her environment for such a long period of time that she is able to manage well, and reorientates herself via news/newspaper/phone for date and time.     Objective      Safety Devices  Type of Devices: Call light within reach;Chair alarm in place;Left in chair (gait belt used during session. Legs eelvated in recliner chair, pt with Neuro Student at end of session.)  Restraints  Restraints Initially in Place: No  Bed Mobility Training  Bed Mobility Training: No  Balance  Sitting: Intact (Good static and dynamic sitting)  Standing:  (static standing Good-, Dynamic standing Fair+)  Transfer Training  Transfer Training: Yes  Overall Level of Assistance: Contact-guard assistance  Interventions: Demonstration;Verbal cues;Safety awareness training  Sit to Stand: Contact-guard assistance  Stand to Sit: Contact-guard assistance (cues for hand placement and safety)     AROM: Within functional limits  PROM: Within functional limits  Strength:

## 2024-07-22 ENCOUNTER — TELEPHONE (OUTPATIENT)
Age: 79
End: 2024-07-22

## 2024-07-22 NOTE — TELEPHONE ENCOUNTER
7/22/2024 - per Epic staff message, patient to be scheduled for a 6 week hospital follow up with Xray Lumbar with Dr. García (L1 and L2 compression fracture), discharged 7/16/24  Discharge notes state patient going to [place], [phone]. Spoke with  who states no patient with that name found at their facility. Called [phone], no answer. Called [phone], wrong number. Called patients clarence Krause, [phone] and left a voicemail message.

## 2024-07-24 NOTE — TELEPHONE ENCOUNTER
7/24/2024 - no call back from daughter. Sending a letter to address on file requesting a call back to schedule a follow up appointment

## 2024-07-26 ENCOUNTER — APPOINTMENT (OUTPATIENT)
Dept: CT IMAGING | Age: 79
DRG: 884 | End: 2024-07-26
Payer: MEDICARE

## 2024-07-26 ENCOUNTER — HOSPITAL ENCOUNTER (INPATIENT)
Age: 79
LOS: 3 days | Discharge: HOME | DRG: 884 | End: 2024-08-01
Attending: EMERGENCY MEDICINE
Payer: MEDICARE
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
## 2024-07-26 PROBLEM — R29.6 RECURRENT FALLS: Status: ACTIVE | Noted: 2024-07-26

## 2024-07-26 LAB
25(OH)D3 SERPL-MCNC: 20.8 NG/ML (ref 30–100)
ANION GAP SERPL CALCULATED.3IONS-SCNC: 13 MMOL/L (ref 9–16)
BASOPHILS # BLD: 0.05 K/UL (ref 0–0.2)
BASOPHILS NFR BLD: 1 % (ref 0–2)
BNP SERPL-MCNC: 341 PG/ML (ref 0–300)
BUN SERPL-MCNC: 21 MG/DL (ref 8–23)
CALCIUM SERPL-MCNC: 9.6 MG/DL (ref 8.6–10.4)
CHLORIDE SERPL-SCNC: 98 MMOL/L (ref 98–107)
CO2 SERPL-SCNC: 22 MMOL/L (ref 20–31)
CREAT SERPL-MCNC: 1.3 MG/DL (ref 0.5–0.9)
EOSINOPHIL # BLD: 0.13 K/UL (ref 0–0.44)
EOSINOPHILS RELATIVE PERCENT: 2 % (ref 1–4)
ERYTHROCYTE [DISTWIDTH] IN BLOOD BY AUTOMATED COUNT: 13.9 % (ref 11.8–14.4)
GFR, ESTIMATED: 42 ML/MIN/1.73M2
GLUCOSE BLD-MCNC: 214 MG/DL (ref 65–105)
GLUCOSE SERPL-MCNC: 232 MG/DL (ref 74–99)
HCT VFR BLD AUTO: 34.1 % (ref 36.3–47.1)
HGB BLD-MCNC: 10 G/DL (ref 11.9–15.1)
IMM GRANULOCYTES # BLD AUTO: 0.03 K/UL (ref 0–0.3)
IMM GRANULOCYTES NFR BLD: 0 %
LYMPHOCYTES NFR BLD: 0.95 K/UL (ref 1.1–3.7)
LYMPHOCYTES RELATIVE PERCENT: 14 % (ref 24–43)
MCH RBC QN AUTO: 23.7 PG (ref 25.2–33.5)
MCHC RBC AUTO-ENTMCNC: 29.3 G/DL (ref 28.4–34.8)
MCV RBC AUTO: 80.8 FL (ref 82.6–102.9)
MONOCYTES NFR BLD: 0.58 K/UL (ref 0.1–1.2)
MONOCYTES NFR BLD: 8 % (ref 3–12)
NEUTROPHILS NFR BLD: 75 % (ref 36–65)
NEUTS SEG NFR BLD: 5.15 K/UL (ref 1.5–8.1)
NRBC BLD-RTO: 0 PER 100 WBC
PLATELET # BLD AUTO: 237 K/UL (ref 138–453)
PMV BLD AUTO: 10 FL (ref 8.1–13.5)
POTASSIUM SERPL-SCNC: 3.2 MMOL/L (ref 3.7–5.3)
RBC # BLD AUTO: 4.22 M/UL (ref 3.95–5.11)
RBC # BLD: ABNORMAL 10*6/UL
SODIUM SERPL-SCNC: 133 MMOL/L (ref 136–145)
TROPONIN I SERPL HS-MCNC: 20 NG/L (ref 0–14)
TROPONIN I SERPL HS-MCNC: 20 NG/L (ref 0–14)
WBC OTHER # BLD: 6.9 K/UL (ref 3.5–11.3)

## 2024-07-26 PROCEDURE — 6370000000 HC RX 637 (ALT 250 FOR IP): Performed by: INTERNAL MEDICINE

## 2024-07-26 PROCEDURE — 6360000004 HC RX CONTRAST MEDICATION: Performed by: EMERGENCY MEDICINE

## 2024-07-26 PROCEDURE — 93005 ELECTROCARDIOGRAM TRACING: CPT

## 2024-07-26 PROCEDURE — 82306 VITAMIN D 25 HYDROXY: CPT

## 2024-07-26 PROCEDURE — 74177 CT ABD & PELVIS W/CONTRAST: CPT

## 2024-07-26 PROCEDURE — 80048 BASIC METABOLIC PNL TOTAL CA: CPT

## 2024-07-26 PROCEDURE — 84484 ASSAY OF TROPONIN QUANT: CPT

## 2024-07-26 PROCEDURE — 6360000002 HC RX W HCPCS: Performed by: INTERNAL MEDICINE

## 2024-07-26 PROCEDURE — 83880 ASSAY OF NATRIURETIC PEPTIDE: CPT

## 2024-07-26 PROCEDURE — 82947 ASSAY GLUCOSE BLOOD QUANT: CPT

## 2024-07-26 PROCEDURE — 71260 CT THORAX DX C+: CPT

## 2024-07-26 PROCEDURE — 99223 1ST HOSP IP/OBS HIGH 75: CPT | Performed by: INTERNAL MEDICINE

## 2024-07-26 PROCEDURE — 72125 CT NECK SPINE W/O DYE: CPT

## 2024-07-26 PROCEDURE — 70450 CT HEAD/BRAIN W/O DYE: CPT

## 2024-07-26 PROCEDURE — 96376 TX/PRO/DX INJ SAME DRUG ADON: CPT

## 2024-07-26 PROCEDURE — 99285 EMERGENCY DEPT VISIT HI MDM: CPT

## 2024-07-26 PROCEDURE — 96372 THER/PROPH/DIAG INJ SC/IM: CPT

## 2024-07-26 PROCEDURE — G0378 HOSPITAL OBSERVATION PER HR: HCPCS

## 2024-07-26 PROCEDURE — 85025 COMPLETE CBC W/AUTO DIFF WBC: CPT

## 2024-07-26 PROCEDURE — 6360000002 HC RX W HCPCS

## 2024-07-26 PROCEDURE — 96374 THER/PROPH/DIAG INJ IV PUSH: CPT

## 2024-07-26 PROCEDURE — 96375 TX/PRO/DX INJ NEW DRUG ADDON: CPT

## 2024-07-26 PROCEDURE — 2580000003 HC RX 258: Performed by: INTERNAL MEDICINE

## 2024-07-26 RX ORDER — LIDOCAINE 4 G/G
1 PATCH TOPICAL DAILY
Status: DISCONTINUED | OUTPATIENT
Start: 2024-07-26 | End: 2024-08-01 | Stop reason: HOSPADM

## 2024-07-26 RX ORDER — INSULIN LISPRO 100 [IU]/ML
0-4 INJECTION, SOLUTION INTRAVENOUS; SUBCUTANEOUS NIGHTLY
Status: DISCONTINUED | OUTPATIENT
Start: 2024-07-26 | End: 2024-08-01 | Stop reason: HOSPADM

## 2024-07-26 RX ORDER — INSULIN LISPRO 100 [IU]/ML
0-4 INJECTION, SOLUTION INTRAVENOUS; SUBCUTANEOUS
Status: DISCONTINUED | OUTPATIENT
Start: 2024-07-27 | End: 2024-08-01 | Stop reason: HOSPADM

## 2024-07-26 RX ORDER — GLUCAGON 1 MG/ML
1 KIT INJECTION PRN
Status: DISCONTINUED | OUTPATIENT
Start: 2024-07-26 | End: 2024-08-01 | Stop reason: HOSPADM

## 2024-07-26 RX ORDER — FENTANYL CITRATE 50 UG/ML
25 INJECTION, SOLUTION INTRAMUSCULAR; INTRAVENOUS ONCE
Status: COMPLETED | OUTPATIENT
Start: 2024-07-26 | End: 2024-07-26

## 2024-07-26 RX ORDER — TIZANIDINE 4 MG/1
4 TABLET ORAL EVERY 6 HOURS PRN
Status: DISCONTINUED | OUTPATIENT
Start: 2024-07-26 | End: 2024-07-28

## 2024-07-26 RX ORDER — HALOPERIDOL 5 MG/ML
2 INJECTION INTRAMUSCULAR ONCE
Status: COMPLETED | OUTPATIENT
Start: 2024-07-26 | End: 2024-07-26

## 2024-07-26 RX ORDER — DEXTROSE MONOHYDRATE 100 MG/ML
INJECTION, SOLUTION INTRAVENOUS CONTINUOUS PRN
Status: DISCONTINUED | OUTPATIENT
Start: 2024-07-26 | End: 2024-08-01 | Stop reason: HOSPADM

## 2024-07-26 RX ORDER — OXYCODONE HYDROCHLORIDE 5 MG/1
5 TABLET ORAL EVERY 6 HOURS PRN
Status: DISCONTINUED | OUTPATIENT
Start: 2024-07-26 | End: 2024-07-28

## 2024-07-26 RX ORDER — OXYCODONE HYDROCHLORIDE 5 MG/1
2.5 TABLET ORAL EVERY 6 HOURS PRN
Status: DISCONTINUED | OUTPATIENT
Start: 2024-07-26 | End: 2024-07-28

## 2024-07-26 RX ADMIN — TIZANIDINE 4 MG: 4 TABLET ORAL at 21:33

## 2024-07-26 RX ADMIN — HALOPERIDOL LACTATE 2 MG: 5 INJECTION, SOLUTION INTRAMUSCULAR at 18:13

## 2024-07-26 RX ADMIN — IOPAMIDOL 75 ML: 755 INJECTION, SOLUTION INTRAVENOUS at 18:18

## 2024-07-26 RX ADMIN — FENTANYL CITRATE 25 MCG: 50 INJECTION, SOLUTION INTRAMUSCULAR; INTRAVENOUS at 16:59

## 2024-07-26 RX ADMIN — OLANZAPINE 2.5 MG: 10 INJECTION, POWDER, FOR SOLUTION INTRAMUSCULAR at 22:22

## 2024-07-26 RX ADMIN — FENTANYL CITRATE 25 MCG: 50 INJECTION, SOLUTION INTRAMUSCULAR; INTRAVENOUS at 19:49

## 2024-07-26 ASSESSMENT — PAIN - FUNCTIONAL ASSESSMENT: PAIN_FUNCTIONAL_ASSESSMENT: 0-10

## 2024-07-26 ASSESSMENT — PAIN SCALES - GENERAL: PAINLEVEL_OUTOF10: 8

## 2024-07-26 NOTE — ED PROVIDER NOTES
[facility]     Emergency Department     Faculty Attestation    I performed a history and physical examination of the patient and discussed management with the resident. I reviewed the resident’s note and agree with the documented findings and plan of care. Any areas of disagreement are noted on the chart. I was personally present for the key portions of any procedures. I have documented in the chart those procedures where I was not present during the key portions. I have reviewed the emergency nurses triage note. I agree with the chief complaint, past medical history, past surgical history, allergies, medications, social and family history as documented unless otherwise noted below.        For Physician Assistant/ Nurse Practitioner cases/documentation I have personally evaluated this patient and have completed at least one if not all key elements of the E/M (history, physical exam, and MDM). Additional findings are as noted.  I have personally seen and evaluated the patient.  I find the patient's history and physical exam are consistent with the NP/PA documentation.  I agree with the care provided, treatment rendered, disposition and follow-up plan.    Patient sustained a fall which she cannot recall.  The patient has underlying history of dementia she is very pleasant has no complaints at the present time and is currently alert but disoriented to place and time.  The patient has noticed no complaints right now    EKG Interpretation    Interpreted by emergency department physician    Rhythm: normal sinus   Rate: normal  Axis: normal  Conduction: normal  ST Segments: no acute change  T Waves: no acute change  Q Waves: no acute change    Clinical Impression:  nonspecific EKG.          Critical Care     Ramsey Palafox M.D.  Attending Emergency  Physician           Ramsey Palafox MD  07/26/24 [id number]

## 2024-07-26 NOTE — ED PROVIDER NOTES
[facility] ED  Emergency Department Encounter  Emergency Medicine Resident     Pt Name:Ana Domingo  MRN: [id number]  Birthdate 1945  Date of evaluation: 7/26/24  PCP:  Destiny Sauceda MD  Note Started: 4:03 PM EDT      CHIEF COMPLAINT       Chief Complaint   Patient presents with    Fall     Found down fall @ facility no thinners DNR CC baseline L1-2 Fxs       HISTORY OF PRESENT ILLNESS  (Location/Symptom, Timing/Onset, Context/Setting, Quality, Duration, Modifying Factors, Severity.)      Ana Domingo is a 78 y.o. female who presents with fall.  Patient presents from nursing home after a fall.  Patient is reportedly at baseline ANO x 1.  Also has baseline L1, L2 fractures from a recent fall couple weeks ago.  Unknown if patient hit head or loss consciousness.  She is not on anticoagulation.  Patient at baseline upon arrival to the emergency department.  Not having any complaints at this time including chest pain, shortness of breath, abdominal pain, nausea, vomiting, diarrhea.  Patient does not remember the accident    PAST MEDICAL / SURGICAL / SOCIAL / FAMILY HISTORY      has a past medical history of Anxiety, Cellulitis, Depression, Diabetes mellitus (HCC), History of blood transfusion, Hyperlipidemia, Hypertension, and Myocardial infarct (HCC).       has a past surgical history that includes Cholecystectomy; Hysterectomy; Tonsillectomy; knee surgery (Left); Appendectomy; Vulva surgery (12/2017); Colonoscopy; Endoscopy, colon, diagnostic; other surgical history (Left, 09/10/2019); and Abdomen surgery (Left, 9/10/2019).    Social History     Socioeconomic History    Marital status: Single     Spouse name: Not on file    Number of children: Not on file    Years of education: Not on file    Highest education level: Not on file   Occupational History    Not on file   Tobacco Use    Smoking status: Former    Smokeless tobacco: Never   Substance and Sexual Activity    Alcohol use: Yes     by mouth daily.    Provider, MD Norberto   carvedilol (COREG) 25 MG tablet Take 12.5 mg by mouth 2 times daily     Norberto Mejias MD   atorvastatin (LIPITOR) 80 MG tablet Take 80 mg by mouth daily.    Norberto Mejias MD         REVIEW OF SYSTEMS       Review of Systems  See HPI  PHYSICAL EXAM      INITIAL VITALS:   BP (!) 164/63   Pulse 70   Temp 98.4 °F (36.9 °C) (Oral)   Resp 16   Ht 1.651 m (5' 5\")   Wt 81.6 kg (180 lb)   SpO2 96%   BMI 29.95 kg/m²     Physical Exam  Constitutional:       Appearance: Normal appearance.   HENT:      Head: Normocephalic and atraumatic.   Neck:      Comments: In c-collar  Cardiovascular:      Rate and Rhythm: Normal rate and regular rhythm.      Pulses: Normal pulses.      Heart sounds: Normal heart sounds.   Pulmonary:      Effort: Pulmonary effort is normal.      Breath sounds: Normal breath sounds.   Abdominal:      General: Abdomen is flat.      Palpations: Abdomen is soft.      Tenderness: There is no abdominal tenderness.   Musculoskeletal:      Cervical back: Tenderness present.   Skin:     General: Skin is warm and dry.      Capillary Refill: Capillary refill takes less than 2 seconds.   Neurological:      General: No focal deficit present.      Mental Status: She is alert.           DDX/DIAGNOSTIC RESULTS / EMERGENCY DEPARTMENT COURSE / MDM     Medical Decision Making  Ana Domingo is a 78 y.o. female who presents with fall.  Patient is GCS 15, nontoxic appearing, not in acute distress, speaking full sentences, able to ambulate under their own power.  Patient at baseline, ANO x 1.  Will obtain trauma pan scans, labs, likely admission considering patient with multiple frequent falls, disoriented at baseline    Amount and/or Complexity of Data Reviewed  Labs: ordered.  Radiology: ordered.  ECG/medicine tests: ordered.    Risk  Prescription drug management.  Decision regarding hospitalization.        EKG  Normal sinus rhythm, regular rate, regular axis,

## 2024-07-27 PROBLEM — F03.90 DEMENTIA (HCC): Status: ACTIVE | Noted: 2024-07-27

## 2024-07-27 LAB
BACTERIA URNS QL MICRO: NORMAL
BILIRUB UR QL STRIP: NEGATIVE
CASTS #/AREA URNS LPF: NORMAL /LPF (ref 0–8)
CLARITY UR: CLEAR
COLOR UR: YELLOW
EPI CELLS #/AREA URNS HPF: NORMAL /HPF (ref 0–5)
GLUCOSE BLD-MCNC: 127 MG/DL (ref 65–105)
GLUCOSE BLD-MCNC: 168 MG/DL (ref 65–105)
GLUCOSE UR STRIP-MCNC: NEGATIVE MG/DL
HGB UR QL STRIP.AUTO: NEGATIVE
KETONES UR STRIP-MCNC: NEGATIVE MG/DL
LEUKOCYTE ESTERASE UR QL STRIP: ABNORMAL
NITRITE UR QL STRIP: NEGATIVE
PH UR STRIP: 5.5 [PH] (ref 5–8)
PROT UR STRIP-MCNC: ABNORMAL MG/DL
RBC #/AREA URNS HPF: NORMAL /HPF (ref 0–4)
SP GR UR STRIP: 1.01 (ref 1–1.03)
UROBILINOGEN UR STRIP-ACNC: NORMAL EU/DL (ref 0–1)
WBC #/AREA URNS HPF: NORMAL /HPF (ref 0–5)

## 2024-07-27 PROCEDURE — 81001 URINALYSIS AUTO W/SCOPE: CPT

## 2024-07-27 PROCEDURE — 82947 ASSAY GLUCOSE BLOOD QUANT: CPT

## 2024-07-27 PROCEDURE — 96372 THER/PROPH/DIAG INJ SC/IM: CPT

## 2024-07-27 PROCEDURE — 6370000000 HC RX 637 (ALT 250 FOR IP): Performed by: INTERNAL MEDICINE

## 2024-07-27 PROCEDURE — 6360000002 HC RX W HCPCS: Performed by: NURSE PRACTITIONER

## 2024-07-27 PROCEDURE — 6360000002 HC RX W HCPCS: Performed by: INTERNAL MEDICINE

## 2024-07-27 PROCEDURE — G0378 HOSPITAL OBSERVATION PER HR: HCPCS

## 2024-07-27 PROCEDURE — 2580000003 HC RX 258: Performed by: INTERNAL MEDICINE

## 2024-07-27 PROCEDURE — 99232 SBSQ HOSP IP/OBS MODERATE 35: CPT | Performed by: INTERNAL MEDICINE

## 2024-07-27 RX ORDER — POLYETHYLENE GLYCOL 3350 17 G/17G
17 POWDER, FOR SOLUTION ORAL DAILY PRN
Status: DISCONTINUED | OUTPATIENT
Start: 2024-07-27 | End: 2024-08-01 | Stop reason: HOSPADM

## 2024-07-27 RX ORDER — ACETAMINOPHEN 500 MG
500 TABLET ORAL 2 TIMES DAILY PRN
Status: DISCONTINUED | OUTPATIENT
Start: 2024-07-27 | End: 2024-08-01 | Stop reason: HOSPADM

## 2024-07-27 RX ORDER — CARVEDILOL 12.5 MG/1
12.5 TABLET ORAL 2 TIMES DAILY
Status: DISCONTINUED | OUTPATIENT
Start: 2024-07-27 | End: 2024-08-01 | Stop reason: HOSPADM

## 2024-07-27 RX ORDER — LISINOPRIL 20 MG/1
20 TABLET ORAL DAILY
Status: DISCONTINUED | OUTPATIENT
Start: 2024-07-27 | End: 2024-07-29

## 2024-07-27 RX ORDER — FLUOXETINE HYDROCHLORIDE 20 MG/1
40 CAPSULE ORAL DAILY
Status: DISCONTINUED | OUTPATIENT
Start: 2024-07-27 | End: 2024-08-01 | Stop reason: HOSPADM

## 2024-07-27 RX ORDER — ONDANSETRON 4 MG/1
4 TABLET, ORALLY DISINTEGRATING ORAL EVERY 8 HOURS PRN
Status: DISCONTINUED | OUTPATIENT
Start: 2024-07-27 | End: 2024-08-01 | Stop reason: HOSPADM

## 2024-07-27 RX ORDER — SODIUM CHLORIDE 9 MG/ML
INJECTION, SOLUTION INTRAVENOUS PRN
Status: DISCONTINUED | OUTPATIENT
Start: 2024-07-27 | End: 2024-08-01 | Stop reason: HOSPADM

## 2024-07-27 RX ORDER — ATORVASTATIN CALCIUM 80 MG/1
80 TABLET, FILM COATED ORAL DAILY
Status: DISCONTINUED | OUTPATIENT
Start: 2024-07-27 | End: 2024-08-01 | Stop reason: HOSPADM

## 2024-07-27 RX ORDER — SODIUM CHLORIDE 0.9 % (FLUSH) 0.9 %
5-40 SYRINGE (ML) INJECTION EVERY 12 HOURS SCHEDULED
Status: DISCONTINUED | OUTPATIENT
Start: 2024-07-27 | End: 2024-08-01 | Stop reason: HOSPADM

## 2024-07-27 RX ORDER — POTASSIUM CHLORIDE 20 MEQ/1
40 TABLET, EXTENDED RELEASE ORAL PRN
Status: DISCONTINUED | OUTPATIENT
Start: 2024-07-27 | End: 2024-08-01 | Stop reason: HOSPADM

## 2024-07-27 RX ORDER — ACETAMINOPHEN 500 MG
1000 TABLET ORAL EVERY 8 HOURS SCHEDULED
Status: DISCONTINUED | OUTPATIENT
Start: 2024-07-27 | End: 2024-08-01 | Stop reason: HOSPADM

## 2024-07-27 RX ORDER — HALOPERIDOL 5 MG/ML
2 INJECTION INTRAMUSCULAR EVERY 6 HOURS PRN
Status: DISCONTINUED | OUTPATIENT
Start: 2024-07-27 | End: 2024-08-01 | Stop reason: HOSPADM

## 2024-07-27 RX ORDER — IBUPROFEN 800 MG/1
800 TABLET ORAL EVERY 8 HOURS PRN
Status: DISCONTINUED | OUTPATIENT
Start: 2024-07-27 | End: 2024-08-01 | Stop reason: HOSPADM

## 2024-07-27 RX ORDER — MAGNESIUM SULFATE 1 G/100ML
1000 INJECTION INTRAVENOUS PRN
Status: DISCONTINUED | OUTPATIENT
Start: 2024-07-27 | End: 2024-08-01 | Stop reason: HOSPADM

## 2024-07-27 RX ORDER — VITAMIN B COMPLEX
5000 TABLET ORAL DAILY
Status: DISCONTINUED | OUTPATIENT
Start: 2024-07-27 | End: 2024-08-01 | Stop reason: HOSPADM

## 2024-07-27 RX ORDER — POTASSIUM CHLORIDE 7.45 MG/ML
10 INJECTION INTRAVENOUS PRN
Status: DISCONTINUED | OUTPATIENT
Start: 2024-07-27 | End: 2024-08-01 | Stop reason: HOSPADM

## 2024-07-27 RX ORDER — SODIUM CHLORIDE 0.9 % (FLUSH) 0.9 %
10 SYRINGE (ML) INJECTION PRN
Status: DISCONTINUED | OUTPATIENT
Start: 2024-07-27 | End: 2024-08-01 | Stop reason: HOSPADM

## 2024-07-27 RX ORDER — ASPIRIN 325 MG/1
325 TABLET, FILM COATED ORAL DAILY
Status: DISCONTINUED | OUTPATIENT
Start: 2024-07-27 | End: 2024-08-01 | Stop reason: HOSPADM

## 2024-07-27 RX ORDER — MONTELUKAST SODIUM 10 MG/1
10 TABLET ORAL NIGHTLY
Status: DISCONTINUED | OUTPATIENT
Start: 2024-07-27 | End: 2024-08-01 | Stop reason: HOSPADM

## 2024-07-27 RX ORDER — ENOXAPARIN SODIUM 100 MG/ML
40 INJECTION SUBCUTANEOUS DAILY
Status: DISCONTINUED | OUTPATIENT
Start: 2024-07-27 | End: 2024-08-01 | Stop reason: HOSPADM

## 2024-07-27 RX ORDER — MULTIVITAMIN WITH IRON
1 TABLET ORAL DAILY
Status: DISCONTINUED | OUTPATIENT
Start: 2024-07-27 | End: 2024-08-01 | Stop reason: HOSPADM

## 2024-07-27 RX ORDER — MEMANTINE HYDROCHLORIDE 5 MG/1
5 TABLET ORAL DAILY
Status: DISCONTINUED | OUTPATIENT
Start: 2024-07-27 | End: 2024-08-01 | Stop reason: HOSPADM

## 2024-07-27 RX ORDER — ONDANSETRON 2 MG/ML
4 INJECTION INTRAMUSCULAR; INTRAVENOUS EVERY 6 HOURS PRN
Status: DISCONTINUED | OUTPATIENT
Start: 2024-07-27 | End: 2024-08-01 | Stop reason: HOSPADM

## 2024-07-27 RX ORDER — HALOPERIDOL 5 MG/ML
2 INJECTION INTRAMUSCULAR ONCE
Status: COMPLETED | OUTPATIENT
Start: 2024-07-27 | End: 2024-07-27

## 2024-07-27 RX ORDER — DOCUSATE SODIUM 100 MG/1
100 CAPSULE, LIQUID FILLED ORAL DAILY
Status: DISCONTINUED | OUTPATIENT
Start: 2024-07-27 | End: 2024-08-01 | Stop reason: HOSPADM

## 2024-07-27 RX ADMIN — FLUOXETINE HYDROCHLORIDE 40 MG: 20 CAPSULE ORAL at 12:26

## 2024-07-27 RX ADMIN — HALOPERIDOL LACTATE 2 MG: 5 INJECTION, SOLUTION INTRAMUSCULAR at 03:07

## 2024-07-27 RX ADMIN — MONTELUKAST SODIUM 10 MG: 10 TABLET, FILM COATED ORAL at 23:00

## 2024-07-27 RX ADMIN — LISINOPRIL 20 MG: 20 TABLET ORAL at 11:35

## 2024-07-27 RX ADMIN — ATORVASTATIN CALCIUM 80 MG: 80 TABLET, FILM COATED ORAL at 11:35

## 2024-07-27 RX ADMIN — Medication 1200 MG: at 23:00

## 2024-07-27 RX ADMIN — THERA TABS 1 TABLET: TAB at 12:26

## 2024-07-27 RX ADMIN — ACETAMINOPHEN 1000 MG: 500 TABLET ORAL at 23:00

## 2024-07-27 RX ADMIN — Medication 10 MG: at 23:00

## 2024-07-27 RX ADMIN — TIZANIDINE 4 MG: 4 TABLET ORAL at 08:10

## 2024-07-27 RX ADMIN — Medication 1200 MG: at 12:25

## 2024-07-27 RX ADMIN — SODIUM CHLORIDE, PRESERVATIVE FREE 10 ML: 5 INJECTION INTRAVENOUS at 22:46

## 2024-07-27 RX ADMIN — HALOPERIDOL LACTATE 2 MG: 5 INJECTION, SOLUTION INTRAMUSCULAR at 13:47

## 2024-07-27 RX ADMIN — OXYCODONE HYDROCHLORIDE 5 MG: 5 TABLET ORAL at 16:38

## 2024-07-27 RX ADMIN — OXYCODONE HYDROCHLORIDE 5 MG: 5 TABLET ORAL at 03:40

## 2024-07-27 RX ADMIN — ASPIRIN 325 MG: 325 TABLET, FILM COATED ORAL at 12:26

## 2024-07-27 RX ADMIN — CARVEDILOL 12.5 MG: 12.5 TABLET, FILM COATED ORAL at 11:35

## 2024-07-27 RX ADMIN — OLANZAPINE 2.5 MG: 10 INJECTION, POWDER, FOR SOLUTION INTRAMUSCULAR at 11:32

## 2024-07-27 RX ADMIN — CARVEDILOL 12.5 MG: 12.5 TABLET, FILM COATED ORAL at 23:00

## 2024-07-27 RX ADMIN — MEMANTINE HYDROCHLORIDE 5 MG: 5 TABLET, FILM COATED ORAL at 12:26

## 2024-07-27 RX ADMIN — CHOLECALCIFEROL TAB 25 MCG (1000 UNIT) 5000 UNITS: 25 TAB at 11:35

## 2024-07-27 ASSESSMENT — PAIN SCALES - GENERAL
PAINLEVEL_OUTOF10: 3
PAINLEVEL_OUTOF10: 10
PAINLEVEL_OUTOF10: 10

## 2024-07-27 ASSESSMENT — PAIN DESCRIPTION - DESCRIPTORS
DESCRIPTORS: ACHING
DESCRIPTORS: ACHING

## 2024-07-27 ASSESSMENT — PAIN DESCRIPTION - ORIENTATION: ORIENTATION: LOWER

## 2024-07-27 ASSESSMENT — PAIN DESCRIPTION - LOCATION
LOCATION: BACK
LOCATION: BACK

## 2024-07-27 ASSESSMENT — PAIN - FUNCTIONAL ASSESSMENT: PAIN_FUNCTIONAL_ASSESSMENT: ACTIVITIES ARE NOT PREVENTED

## 2024-07-27 NOTE — CARE COORDINATION
DISCHARGE PLANNING EVALUATION: OP/OBSERVATION        7/27/24, 12:48 PM EDT    Ana Domingo         Location: OBS 18/18-1   Reason for hospitalization: Recurrent falls [R29.6]  Fall, initial encounter [W19.XXXA]     CM Services requested for transitional needs.needs SNF placement     PCP: Destiny Sauceda MD    Transportation/Food Security/Housing Addressed:  No issues identified.     Equipment needs:     Case Management Services Information Letter Provided [x]    Transition plan: plan is long term SNF placement and will need transportation.    Met w/ daughter and grand-daughter. Patient needs SNF w/ plan to transition to long term care. Patient is totally confused and restless. Daughter states has had a rapid deterioration in mental states over the past 3 weeks. Prefers to have patient go somewhere that takes long term care. Was at [place] and they do not offer long term care.                       Post Acute Facility/Agency List     Provided obdulia Charles with the following list, the list includes the overall star ratings obtained from CMS per the Medicare Web site (www.Medicare.gov):     [] Long Term Acute Care Facilities  [] Acute Inpatient Rehabilitation Facilities  [x] Skilled Nursing Facilities  [] Home Care    Provided verbal instructions on how to utilize the QR Code to obtain additional detailed star ratings from www.Medicare.gov     offered to print and provide the detailed list:    []Accepted   [x]Declined    The following SNF choices were made: 1) [facility] 2) [facility] 3) [facility] and referrals were sent    3:20 PM Referrals made to [facility]. Meeting here at 11 AM w/ Hospice ([facility]) and daughters for informational

## 2024-07-27 NOTE — PROGRESS NOTES
[facility]  Office: [phone]  Mio Hauser DO, Damien Cisneros, DO, Jarred Guzman DO, Te Clinton, DO, Luther Kulkarni MD, Yaneth Sawant MD, Lindsey James MD, Farideh Encarnacion MD,  Luke Shirley MD, Zev Mccall MD, Edenilson Chua MD,  Emma Kincaid DO, Brenda Colon MD, Ben Singer MD, Mat Hauser DO, Catie Plata MD,  Con Cordoba DO, Jazzy Thomas MD, Dahlia Damian MD, Chapis Hernández MD, James Silveira MD,  Kevyn Su MD, Ani Caldwell MD, Usman Pierson MD, Kurt Hammer MD, Franki Aaron MD, Azalia Holman MD, Mesfin Gagnon DO, Win Nolasco DO, Janusz Morales MD,  Chadwick Enriquez MD, Shirley Waterhouse, CNP,  Terrie Palacios CNP, Mt [place], CNP,  Marcelina Sandoval, DNP, Nataliya Ashford, CNP, Claribel Green, CNP, Holli Alexander CNP, Kacy Mayo, CNP, Deena Stanton, PA-C, Michell Mireles PA-C, Kassandra Rajput, CNP, Margy Simons, CNP, Hayley Johns, CNP, Virginia Godfrey, CNP, Honey Page, CNP, Mariangel Forbes, CNS, Lucie Castle, CNP, Radha Wynn CNP, Tracy Schwab, CNP         [facility]   IN-PATIENT SERVICE   [facility]    Progress Note    7/27/2024    12:00 PM    Name:   Ana Domingo  MRN:     [id number]     Acct:      [id number]   Room:   OBS 18/18-1  IP Day:  0  Admit Date:  7/26/2024  4:02 PM    PCP:   Destiny Sauceda MD  Code Status:  Full Code    Subjective:     C/C:   Chief Complaint   Patient presents with    Fall     Found down fall @ facility no thinners DNR CC baseline L1-2 Fxs     Interval History Status: not changed.     No issues overnight  Remains confused this AM  States she is applying for a job  No other complaints  Sitter at bedside  Discussed with CM    Brief History:     Ana Domingo is a 78 y.o.  female with DM 2, HTN,HLP cognitive impairment, history of recurrent falls status post recent admission with lumbar burst fracture L2, fragility fracture L1,  discharged from trauma service 07/16 2  ibuprofen, OLANZapine (ZyPREXA) 2.5 mg in sterile water 0.5 mL injection, oxyCODONE **OR** oxyCODONE, tiZANidine, glucose, dextrose bolus **OR** dextrose bolus, glucagon (rDNA), dextrose    Data:     Past Medical History:   has a past medical history of Anxiety, Cellulitis, Depression, Diabetes mellitus (HCC), History of blood transfusion, Hyperlipidemia, Hypertension, and Myocardial infarct (HCC).    Social History:   reports that she has quit smoking. She has never used smokeless tobacco. She reports current alcohol use. She reports that she does not use drugs.     Family History: No family history on file.    Vitals:  BP (!) 166/60   Pulse 73   Temp 97.2 °F (36.2 °C) (Temporal)   Resp 18   Ht 1.651 m (5' 5\")   Wt 81.6 kg (180 lb)   SpO2 95%   BMI 29.95 kg/m²   Temp (24hrs), Av °F (36.7 °C), Min:97.2 °F (36.2 °C), Max:98.4 °F (36.9 °C)    Recent Labs     24   POCGLU 214*       I/O (24Hr):    Intake/Output Summary (Last 24 hours) at 2024 1200  Last data filed at 2024 0650  Gross per 24 hour   Intake --   Output 500 ml   Net -500 ml       Labs:  Hematology:  Recent Labs     24  1622   WBC 6.9   RBC 4.22   HGB 10.0*   HCT 34.1*   MCV 80.8*   MCH 23.7*   MCHC 29.3   RDW 13.9      MPV 10.0     Chemistry:  Recent Labs     24  1622 24  1907   *  --    K 3.2*  --    CL 98  --    CO2 22  --    GLUCOSE 232*  --    BUN 21  --    CREATININE 1.3*  --    ANIONGAP 13  --    LABGLOM 42*  --    CALCIUM 9.6  --    PROBNP 341*  --    TROPHS 20* 20*     Recent Labs     24  2207   POCGLU 214*     ABG:  Lab Results   Component Value Date/Time    FIO2 INFORMATION NOT PROVIDED 2023 07:53 PM     Lab Results   Component Value Date/Time    SPECIAL NOT REPORTED 09/10/2019 01:51 PM    SPECIAL NOT REPORTED 09/10/2019 01:51 PM    SPECIAL NOT REPORTED 09/10/2019 01:51 PM     Lab Results   Component Value Date/Time    CULTURE NO SIGNIFICANT GROWTH 2024 01:57 AM

## 2024-07-27 NOTE — PROGRESS NOTES
[facility]  [place]ESS NOTE    Shift date: 07/26/2024  Shift day: Friday   Shift # 2    Room # 09/09   Name: Ana Domingo                Nondenominational:    Place of Temple:     Referral: Routine Visit    Admit Date & Time: 7/26/2024  4:02 PM    Assessment:  Ana Domingo is a 78 y.o. female in the hospital because of fall. Patient appeared calm and coping during visit.      Intervention:  Writer introduced self and title as . Patient did not appear to mind  presence and engaged in conversation. Patient discussed her health and what led to her hospital visit. Patient stated family is aware she is on campus and will be arriving soon.  provided a supportive presence through active listening and words of affirmation.    Outcome:  Patient appeared receptive for listening presence to talk through health and concerns.    Plan:  Chaplains will remain available to offer spiritual and emotional support as needed.      Electronically signed by Chaplain Calderon, on 7/26/2024 at 8:27 PM.  [facility]  [phone]

## 2024-07-27 NOTE — H&P
[facility]  Office: [phone]  Mio Hauser DO, Damien Cisneros DO, Jarred Guzman DO, Te Clinton DO, Luther Kulkarni MD, Yaneth Sawant MD, Lindsey James MD, Farideh Encarnacion MD,  Luke Shirley MD, Zev Mccall MD, Edenilson Chua MD,  Emma Kincaid DO, Brenda Colon MD, Ben Singer MD, Mat Hauser DO, Catie Plata MD,  Con Cordoba DO, Jazzy Thomas MD, Dahlia Damian MD, Chapis Hernández MD, James Silveira MD,  Kevyn Su MD, Ani Caldwell MD, Usman Pierson MD, Kurt Hammer MD, Franki Aaron MD, Azalia Holman MD, Mesfin Gagnon DO, Win Nolasco DO, Janusz Morales MD,  Chadwick Enriquez MD, Shirley Waterhouse, CNP,  Terrie Palacios CNP, [place], CNP,  Marcelina Sandoval, DNP, Nataliya Ashford, CNP, Claribel Green, CNP, Holli Alexander CNP, Kacy Mayo, CNP, Deena Stanton, PA-C, Michell Mireles PA-C, Kassandra Rajput, CNP, Margy Simons, CNP, Hayley Johns, CNP, Virginia Godfrey, CNP, Honey Page, CNP, Mariangel Forbes, CNS, Lucie Catsle, CNP, Radha Wynn CNP, Tracy Schwab, CNP         [facility]   IN-PATIENT SERVICE   [facility]    HISTORY AND PHYSICAL EXAMINATION            Date:   7/26/2024  Patient name:  Ana Domingo  Date of admission:  7/26/2024  4:02 PM  MRN:   [id number]  Account:  [id number]  YOB: 1945  PCP:    Destiny Sauceda MD  Room:   09/09  Code Status:    DNR-CCA    Chief Complaint:     Chief Complaint   Patient presents with    Fall     Found down fall @ facility no thinners DNR CC baseline L1-2 Fxs   \" I need to sit up\"    History Obtained From:     patient    History of Present Illness:     Ana Domingo is a 78 y.o.  female with DM 2, HTN,HLP cognitive impairment, history of recurrent falls status post recent admission with lumbar burst fracture L2, fragility fracture L1,  discharged from trauma service 07/16 2 SNF for rehab.  Family describes 3 additional falls since being at rehab with

## 2024-07-27 NOTE — PROGRESS NOTES
SPIRITUAL HEALTH   [facility]  PROGRESS NOTE    Shift date: 7.26.2024  Shift day: Friday   Shift # 2    Room # OBS 18/18-1        Name: Ana Domingo MRN: [id number]    Age: 78 y.o.     Sex: female   Language: English   Temple: Christianity   Recurrent falls     Referral: Emotional Support    Date: 7/26/2024            Total Time Calculated: (P) 20 min              Spiritual Assessment began in [facility] OBSERVATION UNIT        Referral/Consult From: (P) Nurse   Encounter Overview/Reason: (P) Follow-up  Service Provided For: (P) Family    Admit Date & Time: 7/26/2024  4:02 PM    Emergency Contacts Listed:  Extended Emergency Contact Information  Primary Emergency Contact: Nelida Cordova  Address:   Home Phone: [phone]  Relation: Child  Secondary Emergency Contact: BrockBlanca  Home Phone: [phone]  Relation: Child      Assessment:  Ana Domingo is a 78 y.o. female in the hospital because Recurrent falls .     Upon entering the room writer observes Family members and Patient appearing Anxious, Overwhelmed, Sad, and Scared. Daughter is taken back by the confusion that the patient experiences and is concerned about the patient's status.  States that the patient has episodes and it seems that the daughter feels helpless during the altered states the patient experiences.  Is not sure how to help the patient in those moments.      Perceived Need:  Assistance in reducing anxiety    Intervention:  Writer introduced self and title as  and provided Provided space for feelings, thoughts, and concerns, Actively listened, Listened empathetically, Acknowledged current situation, Acknowledged difficult experiences, Asked guided questions, and Cultivated a relationship of care and support.    Outcome:  Debriefed experience, Deescalated emotionally charged situation, Demonstrated caring concern, and Demonstrated kindness and compassion     Plan:  Chaplains will follow up as needed       Electronically

## 2024-07-27 NOTE — ED NOTES
Pt back from CT via stretcher  
Pt came to ed via EMS d/t a reported fall last night. Pt was found down at her E[facility] by staff, unknown downtime. No thinners reported, EMS run sheet states DNR-CC.     Pt is reportedly Aox4 1 at baseline.    Prior L1-L2 fxs.     VSS, NAD, Patient resting in bed, respirations even and unlabored. Call light in reach.  Pt connected to telemetry, all alarms on and audible.     
Pt given towels to fold to mitigate dementia risks. Pt tolerating well. Family remains at bedside, siderails up 2x  
Pt next for CT  
Pt repositioned in bed.  Admitting team at bedside.  Pt removed from telemetry per Attendings verbal orders.  Family is at bedside.  Pt is confused but redirectable.  
Troponin 20, Dr. Napoles aware  
daily     DOCUSATE SODIUM (COLACE, DULCOLAX) 100 MG CAPS    Take 100 mg by mouth daily    FLUOXETINE (PROZAC) 40 MG CAPSULE    Take 1 capsule by mouth daily    GLIMEPIRIDE (AMARYL) 4 MG TABLET    Take 1 tablet by mouth every morning (before breakfast)    IBUPROFEN (ADVIL;MOTRIN) 800 MG TABLET    Take 800 mg by mouth every 6 hours as needed for Pain    LISINOPRIL (PRINIVIL;ZESTRIL) 20 MG TABLET    Take 20 mg by mouth daily.    MELATONIN 10 MG TABS    Take 1 tablet by mouth nightly    MONTELUKAST (SINGULAIR) 10 MG TABLET    Take 10 mg by mouth nightly    MULTIPLE VITAMIN (MULTI-DAY VITAMINS) TABS    Take 1 tablet by mouth daily    SERTRALINE (ZOLOFT) 50 MG TABLET    Take 50 mg by mouth daily    SOLIFENACIN (VESICARE) 5 MG TABLET    Take 10 mg by mouth daily     Orders Placed This Encounter   Medications    fentaNYL (SUBLIMAZE) injection 25 mcg    iopamidol (ISOVUE-370) 76 % injection 75 mL    haloperidol lactate (HALDOL) injection 2 mg    fentaNYL (SUBLIMAZE) injection 25 mcg    lidocaine 4 % external patch 1 patch    OR Linked Order Group     oxyCODONE (ROXICODONE) immediate release tablet 2.5 mg     oxyCODONE (ROXICODONE) immediate release tablet 5 mg    tiZANidine (ZANAFLEX) tablet 4 mg    insulin lispro (HUMALOG,ADMELOG) injection vial 0-4 Units    insulin lispro (HUMALOG,ADMELOG) injection vial 0-4 Units    glucose chewable tablet 16 g    OR Linked Order Group     dextrose bolus 10% 125 mL     dextrose bolus 10% 250 mL    glucagon injection 1 mg    dextrose 10 % infusion       SURGICAL HISTORY       Past Surgical History:   Procedure Laterality Date    ABDOMEN SURGERY Left 9/10/2019    ABDOMEN DEBRIDEMENT WOUND CLOSURE ABDOMINAL LEFT LOWER performed by Phoenix Jovel DO at [facility] OR    APPENDECTOMY      CHOLECYSTECTOMY      COLONOSCOPY      ENDOSCOPY, COLON, DIAGNOSTIC      HYSTERECTOMY (CERVIX STATUS UNKNOWN)      KNEE SURGERY Left     OTHER SURGICAL HISTORY Left 09/10/2019    Incision and drainage of left lower

## 2024-07-27 NOTE — PLAN OF CARE
scale   Administer analgesics based on type and severity of pain and evaluate response   Implement non-pharmacological measures as appropriate and evaluate response   Consider cultural and social influences on pain and pain management   Notify Licensed Independent Practitioner if interventions unsuccessful or patient reports new pain     Problem: Confusion  Goal: Confusion, delirium, dementia, or psychosis is improved or at baseline  Description: INTERVENTIONS:  1. Assess for possible contributors to thought disturbance, including medications, impaired vision or hearing, underlying metabolic abnormalities, dehydration, psychiatric diagnoses, and notify attending LIP  2. [place] high risk fall precautions, as indicated  3. Provide frequent short contacts to provide reality reorientation, refocusing and direction  4. Decrease environmental stimuli, including noise as appropriate  5. Monitor and intervene to maintain adequate nutrition, hydration, elimination, sleep and activity  6. If unable to ensure safety without constant attention obtain sitter and review sitter guidelines with assigned personnel  7. Initiate Psychosocial CNS and Spiritual Care consult, as indicated  7/27/2024 1839 by Nikole Isaac, RN  Outcome: Progressing  7/27/2024 0649 by Ysabel Iniguez, RN  Outcome: Progressing  Flowsheets (Taken 7/27/2024 0649)  Effect of thought disturbance (confusion, delirium, dementia, or psychosis) are managed with adequate functional status:   Assess for contributors to thought disturbance, including medications, impaired vision or hearing, underlying metabolic abnormalities, dehydration, psychiatric diagnoses, notify LIP   [place] high risk fall precautions, as indicated   Provide frequent short contacts to provide reality reorientation, refocusing and direction   Decrease environmental stimuli, including noise as appropriate   Monitor and intervene to maintain adequate nutrition, hydration, elimination, sleep  and activity   If unable to ensure safety without constant attention obtain sitter and review sitter guidelines with assigned personnel

## 2024-07-27 NOTE — PLAN OF CARE
Problem: Chronic Conditions and Co-morbidities  Goal: Patient's chronic conditions and co-morbidity symptoms are monitored and maintained or improved  Outcome: Progressing  Flowsheets (Taken 7/27/2024 0649)  Care Plan - Patient's Chronic Conditions and Co-Morbidity Symptoms are Monitored and Maintained or Improved:   Monitor and assess patient's chronic conditions and comorbid symptoms for stability, deterioration, or improvement   Collaborate with multidisciplinary team to address chronic and comorbid conditions and prevent exacerbation or deterioration   Update acute care plan with appropriate goals if chronic or comorbid symptoms are exacerbated and prevent overall improvement and discharge     Problem: Discharge Planning  Goal: Discharge to home or other facility with appropriate resources  Outcome: Progressing  Flowsheets (Taken 7/27/2024 0649)  Discharge to home or other facility with appropriate resources:   Identify barriers to discharge with patient and caregiver   Identify discharge learning needs (meds, wound care, etc)     Problem: Safety - Adult  Goal: Free from fall injury  Outcome: Progressing  Flowsheets (Taken 7/27/2024 0649)  Free From Fall Injury: Instruct family/caregiver on patient safety     Problem: Pain  Goal: Verbalizes/displays adequate comfort level or baseline comfort level  Outcome: Progressing  Flowsheets (Taken 7/27/2024 0649)  Verbalizes/displays adequate comfort level or baseline comfort level:   Encourage patient to monitor pain and request assistance   Assess pain using appropriate pain scale   Administer analgesics based on type and severity of pain and evaluate response   Implement non-pharmacological measures as appropriate and evaluate response   Consider cultural and social influences on pain and pain management   Notify Licensed Independent Practitioner if interventions unsuccessful or patient reports new pain     Problem: Confusion  Goal: Confusion, delirium, dementia, or  psychosis is improved or at baseline  Description: INTERVENTIONS:  1. Assess for possible contributors to thought disturbance, including medications, impaired vision or hearing, underlying metabolic abnormalities, dehydration, psychiatric diagnoses, and notify attending LIP  2. [place] high risk fall precautions, as indicated  3. Provide frequent short contacts to provide reality reorientation, refocusing and direction  4. Decrease environmental stimuli, including noise as appropriate  5. Monitor and intervene to maintain adequate nutrition, hydration, elimination, sleep and activity  6. If unable to ensure safety without constant attention obtain sitter and review sitter guidelines with assigned personnel  7. Initiate Psychosocial CNS and Spiritual Care consult, as indicated  Outcome: Progressing  Flowsheets (Taken 7/27/2024 6177)  Effect of thought disturbance (confusion, delirium, dementia, or psychosis) are managed with adequate functional status:   Assess for contributors to thought disturbance, including medications, impaired vision or hearing, underlying metabolic abnormalities, dehydration, psychiatric diagnoses, notify LIP   [place] high risk fall precautions, as indicated   Provide frequent short contacts to provide reality reorientation, refocusing and direction   Decrease environmental stimuli, including noise as appropriate   Monitor and intervene to maintain adequate nutrition, hydration, elimination, sleep and activity   If unable to ensure safety without constant attention obtain sitter and review sitter guidelines with assigned personnel

## 2024-07-28 LAB
ANION GAP SERPL CALCULATED.3IONS-SCNC: 11 MMOL/L (ref 9–16)
BASOPHILS # BLD: 0.04 K/UL (ref 0–0.2)
BASOPHILS NFR BLD: 1 % (ref 0–2)
BUN SERPL-MCNC: 18 MG/DL (ref 8–23)
CALCIUM SERPL-MCNC: 9.5 MG/DL (ref 8.6–10.4)
CHLORIDE SERPL-SCNC: 105 MMOL/L (ref 98–107)
CO2 SERPL-SCNC: 24 MMOL/L (ref 20–31)
CREAT SERPL-MCNC: 1.3 MG/DL (ref 0.5–0.9)
EOSINOPHIL # BLD: 0.1 K/UL (ref 0–0.44)
EOSINOPHILS RELATIVE PERCENT: 1 % (ref 1–4)
ERYTHROCYTE [DISTWIDTH] IN BLOOD BY AUTOMATED COUNT: 13.9 % (ref 11.8–14.4)
GFR, ESTIMATED: 44 ML/MIN/1.73M2
GLUCOSE BLD-MCNC: 110 MG/DL (ref 65–105)
GLUCOSE BLD-MCNC: 118 MG/DL (ref 65–105)
GLUCOSE BLD-MCNC: 181 MG/DL (ref 65–105)
GLUCOSE BLD-MCNC: 63 MG/DL (ref 65–105)
GLUCOSE BLD-MCNC: 70 MG/DL (ref 65–105)
GLUCOSE SERPL-MCNC: 40 MG/DL (ref 74–99)
HCT VFR BLD AUTO: 29.1 % (ref 36.3–47.1)
HGB BLD-MCNC: 8.6 G/DL (ref 11.9–15.1)
IMM GRANULOCYTES # BLD AUTO: 0.04 K/UL (ref 0–0.3)
IMM GRANULOCYTES NFR BLD: 1 %
INR PPP: 1.1
LYMPHOCYTES NFR BLD: 1.69 K/UL (ref 1.1–3.7)
LYMPHOCYTES RELATIVE PERCENT: 20 % (ref 24–43)
MAGNESIUM SERPL-MCNC: 1.8 MG/DL (ref 1.6–2.4)
MCH RBC QN AUTO: 23.5 PG (ref 25.2–33.5)
MCHC RBC AUTO-ENTMCNC: 29.6 G/DL (ref 28.4–34.8)
MCV RBC AUTO: 79.5 FL (ref 82.6–102.9)
MONOCYTES NFR BLD: 0.94 K/UL (ref 0.1–1.2)
MONOCYTES NFR BLD: 11 % (ref 3–12)
NEUTROPHILS NFR BLD: 66 % (ref 36–65)
NEUTS SEG NFR BLD: 5.63 K/UL (ref 1.5–8.1)
NRBC BLD-RTO: 0 PER 100 WBC
PHOSPHATE SERPL-MCNC: 4.3 MG/DL (ref 2.5–4.5)
PLATELET # BLD AUTO: 297 K/UL (ref 138–453)
PMV BLD AUTO: 10.5 FL (ref 8.1–13.5)
POTASSIUM SERPL-SCNC: 2.4 MMOL/L (ref 3.7–5.3)
POTASSIUM SERPL-SCNC: 3.8 MMOL/L (ref 3.7–5.3)
PROTHROMBIN TIME: 14.2 SEC (ref 11.7–14.9)
RBC # BLD AUTO: 3.66 M/UL (ref 3.95–5.11)
RBC # BLD: ABNORMAL 10*6/UL
SODIUM SERPL-SCNC: 140 MMOL/L (ref 136–145)
WBC OTHER # BLD: 8.4 K/UL (ref 3.5–11.3)

## 2024-07-28 PROCEDURE — 6360000002 HC RX W HCPCS: Performed by: INTERNAL MEDICINE

## 2024-07-28 PROCEDURE — 96372 THER/PROPH/DIAG INJ SC/IM: CPT

## 2024-07-28 PROCEDURE — 97530 THERAPEUTIC ACTIVITIES: CPT

## 2024-07-28 PROCEDURE — 83735 ASSAY OF MAGNESIUM: CPT

## 2024-07-28 PROCEDURE — 97535 SELF CARE MNGMENT TRAINING: CPT

## 2024-07-28 PROCEDURE — 6370000000 HC RX 637 (ALT 250 FOR IP): Performed by: INTERNAL MEDICINE

## 2024-07-28 PROCEDURE — 85610 PROTHROMBIN TIME: CPT

## 2024-07-28 PROCEDURE — 84132 ASSAY OF SERUM POTASSIUM: CPT

## 2024-07-28 PROCEDURE — G0378 HOSPITAL OBSERVATION PER HR: HCPCS

## 2024-07-28 PROCEDURE — 96376 TX/PRO/DX INJ SAME DRUG ADON: CPT

## 2024-07-28 PROCEDURE — 97166 OT EVAL MOD COMPLEX 45 MIN: CPT

## 2024-07-28 PROCEDURE — 82947 ASSAY GLUCOSE BLOOD QUANT: CPT

## 2024-07-28 PROCEDURE — 2580000003 HC RX 258: Performed by: INTERNAL MEDICINE

## 2024-07-28 PROCEDURE — 36415 COLL VENOUS BLD VENIPUNCTURE: CPT

## 2024-07-28 PROCEDURE — 97162 PT EVAL MOD COMPLEX 30 MIN: CPT

## 2024-07-28 PROCEDURE — 80048 BASIC METABOLIC PNL TOTAL CA: CPT

## 2024-07-28 PROCEDURE — 84100 ASSAY OF PHOSPHORUS: CPT

## 2024-07-28 PROCEDURE — 96365 THER/PROPH/DIAG IV INF INIT: CPT

## 2024-07-28 PROCEDURE — 99232 SBSQ HOSP IP/OBS MODERATE 35: CPT | Performed by: INTERNAL MEDICINE

## 2024-07-28 PROCEDURE — 85025 COMPLETE CBC W/AUTO DIFF WBC: CPT

## 2024-07-28 RX ORDER — POTASSIUM CHLORIDE 20 MEQ/1
40 TABLET, EXTENDED RELEASE ORAL ONCE
Status: COMPLETED | OUTPATIENT
Start: 2024-07-28 | End: 2024-07-28

## 2024-07-28 RX ADMIN — POTASSIUM CHLORIDE 10 MEQ: 7.46 INJECTION, SOLUTION INTRAVENOUS at 12:50

## 2024-07-28 RX ADMIN — Medication 10 MG: at 19:57

## 2024-07-28 RX ADMIN — ACETAMINOPHEN 1000 MG: 500 TABLET ORAL at 19:57

## 2024-07-28 RX ADMIN — ENOXAPARIN SODIUM 40 MG: 100 INJECTION SUBCUTANEOUS at 09:56

## 2024-07-28 RX ADMIN — POTASSIUM CHLORIDE 10 MEQ: 7.46 INJECTION, SOLUTION INTRAVENOUS at 10:52

## 2024-07-28 RX ADMIN — Medication 1200 MG: at 09:56

## 2024-07-28 RX ADMIN — CHOLECALCIFEROL TAB 25 MCG (1000 UNIT) 5000 UNITS: 25 TAB at 09:56

## 2024-07-28 RX ADMIN — POTASSIUM CHLORIDE 10 MEQ: 7.46 INJECTION, SOLUTION INTRAVENOUS at 11:50

## 2024-07-28 RX ADMIN — CARVEDILOL 12.5 MG: 12.5 TABLET, FILM COATED ORAL at 20:00

## 2024-07-28 RX ADMIN — POTASSIUM CHLORIDE 40 MEQ: 1500 TABLET, EXTENDED RELEASE ORAL at 09:59

## 2024-07-28 RX ADMIN — POTASSIUM CHLORIDE 10 MEQ: 7.46 INJECTION, SOLUTION INTRAVENOUS at 07:30

## 2024-07-28 RX ADMIN — LISINOPRIL 20 MG: 20 TABLET ORAL at 09:59

## 2024-07-28 RX ADMIN — POTASSIUM CHLORIDE 10 MEQ: 7.46 INJECTION, SOLUTION INTRAVENOUS at 14:03

## 2024-07-28 RX ADMIN — POTASSIUM CHLORIDE 10 MEQ: 7.46 INJECTION, SOLUTION INTRAVENOUS at 09:54

## 2024-07-28 RX ADMIN — SODIUM CHLORIDE: 9 INJECTION, SOLUTION INTRAVENOUS at 15:20

## 2024-07-28 RX ADMIN — ASPIRIN 325 MG: 325 TABLET, FILM COATED ORAL at 09:56

## 2024-07-28 RX ADMIN — MEMANTINE HYDROCHLORIDE 5 MG: 5 TABLET, FILM COATED ORAL at 09:59

## 2024-07-28 RX ADMIN — MONTELUKAST SODIUM 10 MG: 10 TABLET, FILM COATED ORAL at 19:57

## 2024-07-28 RX ADMIN — THERA TABS 1 TABLET: TAB at 09:56

## 2024-07-28 RX ADMIN — FLUOXETINE HYDROCHLORIDE 40 MG: 20 CAPSULE ORAL at 09:56

## 2024-07-28 RX ADMIN — Medication 1200 MG: at 19:57

## 2024-07-28 RX ADMIN — ATORVASTATIN CALCIUM 80 MG: 80 TABLET, FILM COATED ORAL at 09:59

## 2024-07-28 ASSESSMENT — PAIN SCALES - GENERAL: PAINLEVEL_OUTOF10: 7

## 2024-07-28 NOTE — PROGRESS NOTES
Occupational Therapy  Facility/Department: [facility] OBSERVATION UNIT  Occupational Therapy Initial Assessment    Name: Ana Domingo  : 1945  MRN: [id number]  Date of Service: 2024  Chief Complaint   Patient presents with    Fall     Found down fall @ facility no thinners DNR CC baseline L1-2 Fxs     Discharge Recommendations:  Patient would benefit from continued therapy after discharge  OT Equipment Recommendations  Equipment Needed: Yes  Mobility Devices: ADL Assistive Devices  ADL Assistive Devices: Reacher;Sock-Aid Soft     Patient Diagnosis(es): The encounter diagnosis was Fall, initial encounter.  Past Medical History:  has a past medical history of Anxiety, Cellulitis, Depression, Diabetes mellitus (HCC), History of blood transfusion, Hyperlipidemia, Hypertension, and Myocardial infarct (HCC).  Past Surgical History:  has a past surgical history that includes Cholecystectomy; Hysterectomy; Tonsillectomy; knee surgery (Left); Appendectomy; Vulva surgery (2017); Colonoscopy; Endoscopy, colon, diagnostic; other surgical history (Left, 09/10/2019); and Abdomen surgery (Left, 9/10/2019).     Assessment   Performance deficits / Impairments: Decreased functional mobility ;Decreased ADL status;Decreased endurance;Decreased high-level IADLs;Decreased balance;Decreased cognition;Decreased safe awareness  Assessment: Patient required Max A for toileting tasks and LB dressing tasks this date d/t decreased functional reach with LSO and difficulty with adaptive techniques to avoid bending. Pt completed rolling to don LSO at Max A and bed mobility at Max A x2 to sit EOB. Pt completed transfers and mobility using RW at CGA and demo's decreased endurance throughout activities. Patient would benefit from continued acute OT services to address functional deficits through skilled intervention to promote independence and safety with ADL/IADLs and functional transfers/mobility for safe return to prior living environment  CGA, noted to have narrow JABIER        Activity Tolerance  Activity Tolerance: Patient limited by fatigue;Patient limited by endurance;Patient limited by pain  Bed mobility  Rolling to Left: Maximum assistance  Rolling to Right: Maximum assistance  Supine to Sit: 2 Person assistance;Maximum assistance  Sit to Supine: 2 Person assistance;Maximum assistance  Scooting: Minimal assistance  Bed Mobility Comments: HOB ~15 degrees with verbal cues for log rolling technique and tactile cues to support; Completed rolling for donning of LSO and for hygiene tasks  Transfers  Sit to stand: Moderate assistance  Stand to sit: Minimal assistance  Transfer Comments: verbal cues for hand placement  Vision  Vision: Impaired  Vision Exceptions: Wears glasses at all times  Hearing  Hearing: Within functional limits  Cognition  Overall Cognitive Status: Exceptions  Arousal/Alertness: Appears intact  Following Commands: Follows one step commands with repetition;Follows one step commands with increased time  Attention Span: Attends with cues to redirect  Safety Judgement: Good awareness of safety precautions;Decreased awareness of need for assistance  Problem Solving: Assistance required to identify errors made;Assistance required to generate solutions;Assistance required to correct errors made  Insights: Decreased awareness of deficits  Initiation: Requires cues for some  Sequencing: Requires cues for some  Orientation  Overall Orientation Status: Impaired  Orientation Level: Oriented to person;Disoriented to time;Disoriented to place;Disoriented to situation     Education Given To: Patient  Education Provided: Role of Therapy;Plan of Care;ADL Adaptive Strategies;Precautions;Transfer Training;Equipment;Fall Prevention Strategies;Mobility Training;Orientation  Education Method: Verbal;Demonstration;Teach Back  Barriers to Learning: Cognition  Education Outcome: Verbalized understanding;Continued education needed     AM-PAC - ADL  AM-PAC

## 2024-07-28 NOTE — PROGRESS NOTES
AM Labs (resulted at 0700)  Glucose 40   Potassium 2.7        -per order, call physician and 10mEq IVPB x 6 doses (60mEq total)        -Dr. Luke Shirley notified at 0724. Per [place], give PRN order of 60mEq IVPB and additional orders to be placed      0715: Pt drank 240mL cranberry-apple juice with sugar added and 1/2 of a chocolate pudding cup with 2 sugar packets    0730: Bag 1 of KCL 10mEq IVPB hung at 80mL/hr due to inability to tolerate ordered rate.    0746: Blood sugar 63    Pt drank 200mL cranberry-apple juice with sugar added.      0807: Blood sugar 70    Patient requesting more cranberry-apple juice and orange juice.   Patient walking halls with PT/OT  Patient requesting cereal for breakfast.

## 2024-07-28 NOTE — DISCHARGE INSTR - COC
Continuity of Care Form    Patient Name: Ana Domingo   :  1945  MRN:  [id number]    Admit date:  2024  Discharge date:  2024    Code Status Order: DNR-CCA   Advance Directives:     Admitting Physician:  Luke Shirley MD  PCP: Destiny Sauceda MD    Discharging Nurse: chase  Discharging Hospital Unit/Room#: OBS /18-1  Discharging Unit Phone Number: [phone]    Emergency Contact:   Extended Emergency Contact Information  Primary Emergency Contact: Nelida Cordova  Address:   Home Phone: [phone]  Relation: Child  Secondary Emergency Contact: Blanca Brock  Home Phone: [phone]  Relation: Child    Past Surgical History:  Past Surgical History:   Procedure Laterality Date    ABDOMEN SURGERY Left 9/10/2019    ABDOMEN DEBRIDEMENT WOUND CLOSURE ABDOMINAL LEFT LOWER performed by Phoenix Jovel DO at [facility]    APPENDECTOMY      CHOLECYSTECTOMY      COLONOSCOPY      ENDOSCOPY, COLON, DIAGNOSTIC      HYSTERECTOMY (CERVIX STATUS UNKNOWN)      KNEE SURGERY Left     OTHER SURGICAL HISTORY Left 09/10/2019    Incision and drainage of left lower abdominal wall abscess    TONSILLECTOMY      VULVA SURGERY  2017    Due to necrotizing fascitis       Immunization History:   Immunization History   Administered Date(s) Administered    COVID-19, MODERNA BLUE border, Primary or Immunocompromised, (age 12y+), IM, 100 mcg/0.5mL 2021, 2021       Active Problems:  Patient Active Problem List   Diagnosis Code    Hyperlipidemia E78.5    Diabetes mellitus (HCC) E11.9    Confusion caused by a drug R41.0, T50.905A    Intentional drug overdose (HCC) T50.902A    Coronary artery disease involving native coronary artery of native heart without angina pectoris I25.10    HTN (hypertension), benign I10    Environmental allergies Z91.09    Suicidal deliberate poisoning (HCC) T65.92XA    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

## 2024-07-28 NOTE — PLAN OF CARE
Problem: Chronic Conditions and Co-morbidities  Goal: Patient's chronic conditions and co-morbidity symptoms are monitored and maintained or improved  Outcome: Progressing  Flowsheets (Taken 7/28/2024 1007)  Care Plan - Patient's Chronic Conditions and Co-Morbidity Symptoms are Monitored and Maintained or Improved:   Monitor and assess patient's chronic conditions and comorbid symptoms for stability, deterioration, or improvement   Collaborate with multidisciplinary team to address chronic and comorbid conditions and prevent exacerbation or deterioration   Update acute care plan with appropriate goals if chronic or comorbid symptoms are exacerbated and prevent overall improvement and discharge     Problem: Discharge Planning  Goal: Discharge to home or other facility with appropriate resources  Outcome: Progressing  Flowsheets (Taken 7/28/2024 1007)  Discharge to home or other facility with appropriate resources:   Identify barriers to discharge with patient and caregiver   Arrange for needed discharge resources and transportation as appropriate   Identify discharge learning needs (meds, wound care, etc)   Refer to discharge planning if patient needs post-hospital services based on physician order or complex needs related to functional status, cognitive ability or social support system     Problem: Safety - Adult  Goal: Free from fall injury  Outcome: Progressing     Problem: Pain  Goal: Verbalizes/displays adequate comfort level or baseline comfort level  Outcome: Progressing     Problem: Confusion  Goal: Confusion, delirium, dementia, or psychosis is improved or at baseline  Description: INTERVENTIONS:  1. Assess for possible contributors to thought disturbance, including medications, impaired vision or hearing, underlying metabolic abnormalities, dehydration, psychiatric diagnoses, and notify attending LIP  2. [place] high risk fall precautions, as indicated  3. Provide frequent short contacts to provide

## 2024-07-28 NOTE — CONSULTS
Comprehensive Nutrition Assessment    Type and Reason for Visit:  Initial, Consult (Poor Intakes/Appetite x 5 or more days)    Nutrition Recommendations/Plan:   Continue current diet.  Encourage intakes as tolerated.  Will provide Glucerna oral supplements with meals.     Malnutrition Assessment:  Malnutrition Status:  At risk for malnutrition (07/28/24 4063)    Context:  Acute Illness     Findings of the 6 clinical characteristics of malnutrition:  Energy Intake:  50% or less of estimated energy requirements for 5 or more days  Weight Loss:  Unable to assess     Body Fat Loss:  No significant body fat loss   Muscle Mass Loss:  No significant muscle mass loss  Fluid Accumulation:  No significant fluid accumulation    Strength:  Not Performed    Nutrition Assessment:    Consulted due to poor intakes/appetite x 5 or more days.  Admitted s/p falls with confusion and agitation.  PMH: DM 2, HTN, HLP, dementia; h/o recurrent falls status post recent admission with lumbar burst fracture L2, fragility fracture L1.  Pt with reports of a poor appetite.  Noted pt has not been eating much with worsening confusion.  Pt taking fluids but not eating much during admission.  Will provide oral supplements with meals which pt can sip on.  Minimal recent weight history for review.  Labs reviewed: K 2.4 mmol/L, Glucose  mg/dL.  Meds include: MVI, KCl replacement.    Nutrition Related Findings:    Labs/Meds reviewed. Wound Type: None       Current Nutrition Intake & Therapies:    Average Meal Intake: 0%, 1-25%  Average Supplements Intake: None Ordered  ADULT DIET; Regular  Additional Calorie Sources:  None    Anthropometric Measures:  Height: 165.1 cm (5' 5\")  Ideal Body Weight (IBW): 125 lbs (57 kg)    Admission Body Weight: 81.6 kg (179 lb 14.3 oz)  Current Body Weight: 81.6 kg (179 lb 14.3 oz), 143.9 % IBW. Weight Source: Bed Scale  Current BMI (kg/m2): 29.9        Weight Adjustment For: No Adjustment                 BMI

## 2024-07-28 NOTE — PROGRESS NOTES
[facility]  Office: [phone]  Mio Hauser DO, Damien Cisneros, DO, Jarred Guzman DO, Te Clinton, DO, Luther Kulkarni MD, Yaneth Sawant MD, Lindsey James MD, Farideh Encarnacion MD,  Luke Shirley MD, Zev Mccall MD, Edenilson Chua MD,  Emma Kincaid DO, Brenda Colon MD, Ben Singer MD, Mat Hauser DO, Catie Plata MD,  Con Cordoba DO, Jazzy Thomas MD, Dahlia Damian MD, Chapis Hernández MD, James Silveira MD,  Kevyn Su MD, Ani Caldwell MD, Usman Pierson MD, Kurt Hammer MD, Franki Aaron MD, Azalia Holman MD, Mesfin Gagnon DO, Win Nolasco DO, Janusz Morales MD,  Chadwick Enriquez MD, Shirley Waterhouse, CNP,  Terrie Palacios CNP, [place], CNP,  Marcelina Sandoval, DNP, Nataliya Ashford, CNP, Claribel Green, CNP, Holli Alexander, CNP, Kacy Mayo, CNP, Deena Stanton, PA-C, Michell Mireles, PA-C, Kassandra Rajput, CNP, Margy Simons, CNP, Hayley Johns, CNP, Virginia Godfrey, CNP, Honey Page, CNP, Mariangel Forbes, CNS, Lucie Castle, CNP, Radha Wynn CNP, Tracy Schwab, CNP         [facility]   IN-PATIENT SERVICE   [facility]    Progress Note    7/28/2024    12:03 PM    Name:   Ana Domingo  MRN:     [id number]     Acct:      [id number]   Room:   OBS 18/18-1  IP Day:  0  Admit Date:  7/26/2024  4:02 PM    PCP:   Destiny Sauceda MD  Code Status:  DNR-CCA    Subjective:     C/C:   Chief Complaint   Patient presents with    Fall     Found down fall @ facility no thinners DNR CC baseline L1-2 Fxs     Interval History Status: not changed.     No issues overnight  Currently resting  Per staff, more appropriate today  Worked with PT  Sitter removed     Brief History:     Ana Domingo is a 78 y.o.  female with DM 2, HTN,HLP cognitive impairment, history of recurrent falls status post recent admission with lumbar burst fracture L2, fragility fracture L1,  discharged from trauma service 07/16 2 SNF for rehab.  Family describes 3

## 2024-07-28 NOTE — PROGRESS NOTES
Physical Therapy  Facility/Department: [facility] OBSERVATION UNIT  Physical Therapy Initial Assessment    Name: Ana Domingo  : 1945  MRN: [id number]  Date of Service: 2024    Discharge Recommendations: Further therapy recommended at discharge.  Chief Complaint   Patient presents with    Fall     Found down fall @ facility no thinners DNR CC baseline L1-2 Fxs     Ana Domingo is a 78 y.o.  female with DM 2, HTN,HLP cognitive impairment, history of recurrent falls status post recent admission with lumbar burst fracture L2, fragility fracture L1,  discharged from trauma service  2 SNF for rehab.  Family describes 3 additional falls since being at rehab with increasing agitation, confusion and brought patient in with Fall (Found down fall @ facility no thinners DNR CC baseline L1-2 Fxs)   and is admitted to the hospital for the management of Recurrent falls      PT Equipment Recommendations  Equipment Needed: No      Patient Diagnosis(es): The encounter diagnosis was Fall, initial encounter.  Past Medical History:  has a past medical history of Anxiety, Cellulitis, Depression, Diabetes mellitus (HCC), History of blood transfusion, Hyperlipidemia, Hypertension, and Myocardial infarct (HCC).  Past Surgical History:  has a past surgical history that includes Cholecystectomy; Hysterectomy; Tonsillectomy; knee surgery (Left); Appendectomy; Vulva surgery (2017); Colonoscopy; Endoscopy, colon, diagnostic; other surgical history (Left, 09/10/2019); and Abdomen surgery (Left, 9/10/2019).    Assessment   Body Structures, Functions, Activity Limitations Requiring Skilled Therapeutic Intervention: Decreased functional mobility ;Decreased ADL status;Decreased body mechanics;Decreased strength;Decreased high-level IADLs;Decreased balance;Decreased endurance;Increased pain;Decreased coordination;Decreased safe awareness;Decreased cognition  Assessment: Pt ambulates 40 ft RW CGA. Pt is max Ax2 for bed mobility and mod  to Right: Maximum assistance  Supine to Sit: 2 Person assistance;Maximum assistance (for trunk and BLE)  Sit to Supine: 2 Person assistance;Maximum assistance (for trunk and BLE)  Scooting: Minimal assistance  Bed Mobility Comments: HOB elevated  Transfers  Sit to Stand: Moderate Assistance  Stand to Sit: Minimal Assistance  Comment: RW for UE support, cues for hand placement with good return  Ambulation  Surface: Level tile  Device: Rolling Walker  Assistance: Contact guard assistance  Gait Deviations: Slow Julianne;Decreased step length;Decreased step height  Distance: 40 ft  Comments: No LOB noted. pt does take one standing rest break x30 s for endurance, and d/t pt reported increased L back pain levels with activity.  More Ambulation?: No  Stairs/Curb  Stairs?: No     Balance  Posture: Fair  Sitting - Static: Good;-  Sitting - Dynamic: Fair  Standing - Static: Fair  Standing - Dynamic: Fair  Comments: Assessed with RW                                                        AM-PAC - Mobility    AM-PAC Basic Mobility - Inpatient   How much help is needed turning from your back to your side while in a flat bed without using bedrails?: A Lot  How much help is needed moving from lying on your back to sitting on the side of a flat bed without using bedrails?: A Lot  How much help is needed moving to and from a bed to a chair?: A Little  How much help is needed standing up from a chair using your arms?: A Lot  How much help is needed walking in hospital room?: A Little  How much help is needed climbing 3-5 steps with a railing?: A Lot  AM-PAC Inpatient Mobility Raw Score : 14  AM-PAC Inpatient T-Scale Score : 38.1  Mobility Inpatient CMS 0-100% Score: 61.29  Mobility Inpatient CMS G-Code Modifier : CL            Goals  Short Term Goals  Time Frame for Short Term Goals: 14 visits  Short Term Goal 1: Complete transfers with RW and mod I  Short Term Goal 2: Complete 300 ft of gait with RW and mod I  Short Term Goal 3:

## 2024-07-29 LAB
GLUCOSE BLD-MCNC: 155 MG/DL (ref 65–105)
GLUCOSE BLD-MCNC: 168 MG/DL (ref 65–105)
GLUCOSE BLD-MCNC: 183 MG/DL (ref 65–105)
GLUCOSE BLD-MCNC: 187 MG/DL (ref 65–105)

## 2024-07-29 PROCEDURE — 99232 SBSQ HOSP IP/OBS MODERATE 35: CPT | Performed by: STUDENT IN AN ORGANIZED HEALTH CARE EDUCATION/TRAINING PROGRAM

## 2024-07-29 PROCEDURE — 82947 ASSAY GLUCOSE BLOOD QUANT: CPT

## 2024-07-29 PROCEDURE — 1200000000 HC SEMI PRIVATE

## 2024-07-29 PROCEDURE — 96372 THER/PROPH/DIAG INJ SC/IM: CPT

## 2024-07-29 PROCEDURE — 6360000002 HC RX W HCPCS: Performed by: INTERNAL MEDICINE

## 2024-07-29 PROCEDURE — 2580000003 HC RX 258: Performed by: INTERNAL MEDICINE

## 2024-07-29 PROCEDURE — 6370000000 HC RX 637 (ALT 250 FOR IP): Performed by: STUDENT IN AN ORGANIZED HEALTH CARE EDUCATION/TRAINING PROGRAM

## 2024-07-29 PROCEDURE — 99223 1ST HOSP IP/OBS HIGH 75: CPT | Performed by: NURSE PRACTITIONER

## 2024-07-29 PROCEDURE — 97530 THERAPEUTIC ACTIVITIES: CPT

## 2024-07-29 PROCEDURE — 6370000000 HC RX 637 (ALT 250 FOR IP): Performed by: INTERNAL MEDICINE

## 2024-07-29 RX ORDER — LISINOPRIL 20 MG/1
40 TABLET ORAL DAILY
Status: DISCONTINUED | OUTPATIENT
Start: 2024-07-30 | End: 2024-08-01 | Stop reason: HOSPADM

## 2024-07-29 RX ORDER — LISINOPRIL 20 MG/1
20 TABLET ORAL ONCE
Status: COMPLETED | OUTPATIENT
Start: 2024-07-29 | End: 2024-07-29

## 2024-07-29 RX ADMIN — CARVEDILOL 12.5 MG: 12.5 TABLET, FILM COATED ORAL at 08:21

## 2024-07-29 RX ADMIN — MEMANTINE HYDROCHLORIDE 5 MG: 5 TABLET, FILM COATED ORAL at 08:18

## 2024-07-29 RX ADMIN — THERA TABS 1 TABLET: TAB at 08:21

## 2024-07-29 RX ADMIN — LISINOPRIL 20 MG: 20 TABLET ORAL at 22:59

## 2024-07-29 RX ADMIN — ENOXAPARIN SODIUM 40 MG: 100 INJECTION SUBCUTANEOUS at 08:17

## 2024-07-29 RX ADMIN — Medication 1200 MG: at 19:52

## 2024-07-29 RX ADMIN — Medication 10 MG: at 19:52

## 2024-07-29 RX ADMIN — Medication 1200 MG: at 08:18

## 2024-07-29 RX ADMIN — ATORVASTATIN CALCIUM 80 MG: 80 TABLET, FILM COATED ORAL at 08:18

## 2024-07-29 RX ADMIN — FLUOXETINE HYDROCHLORIDE 40 MG: 20 CAPSULE ORAL at 08:18

## 2024-07-29 RX ADMIN — CHOLECALCIFEROL TAB 25 MCG (1000 UNIT) 5000 UNITS: 25 TAB at 08:18

## 2024-07-29 RX ADMIN — MONTELUKAST SODIUM 10 MG: 10 TABLET, FILM COATED ORAL at 19:52

## 2024-07-29 RX ADMIN — SODIUM CHLORIDE, PRESERVATIVE FREE 10 ML: 5 INJECTION INTRAVENOUS at 19:56

## 2024-07-29 RX ADMIN — CARVEDILOL 12.5 MG: 12.5 TABLET, FILM COATED ORAL at 20:49

## 2024-07-29 RX ADMIN — ACETAMINOPHEN 1000 MG: 500 TABLET ORAL at 06:41

## 2024-07-29 RX ADMIN — DOCUSATE SODIUM 100 MG: 100 CAPSULE, LIQUID FILLED ORAL at 08:18

## 2024-07-29 RX ADMIN — ACETAMINOPHEN 1000 MG: 500 TABLET ORAL at 20:49

## 2024-07-29 RX ADMIN — SODIUM CHLORIDE, PRESERVATIVE FREE 10 ML: 5 INJECTION INTRAVENOUS at 08:21

## 2024-07-29 RX ADMIN — ONDANSETRON 4 MG: 4 TABLET, ORALLY DISINTEGRATING ORAL at 12:00

## 2024-07-29 RX ADMIN — ACETAMINOPHEN 1000 MG: 500 TABLET ORAL at 12:59

## 2024-07-29 RX ADMIN — LISINOPRIL 20 MG: 20 TABLET ORAL at 08:18

## 2024-07-29 RX ADMIN — ASPIRIN 325 MG: 325 TABLET, FILM COATED ORAL at 08:18

## 2024-07-29 ASSESSMENT — PAIN SCALES - GENERAL
PAINLEVEL_OUTOF10: 4
PAINLEVEL_OUTOF10: 4
PAINLEVEL_OUTOF10: 8

## 2024-07-29 ASSESSMENT — PAIN DESCRIPTION - LOCATION
LOCATION: HIP
LOCATION: BACK

## 2024-07-29 ASSESSMENT — PAIN DESCRIPTION - ORIENTATION: ORIENTATION: RIGHT

## 2024-07-29 ASSESSMENT — PAIN DESCRIPTION - DESCRIPTORS: DESCRIPTORS: ACHING

## 2024-07-29 NOTE — PROGRESS NOTES
Endurance training, Equipment evaluation, education, & procurement, Patient/Caregiver education & training, Neuromuscular re-education, Safety education & training, Gait training, Home exercise program, Stair training, Therapeutic activities  Safety Devices  Type of Devices: All fall risk precautions in place, Call light within reach, Gait belt, Nurse notified, Left in bed, Bed alarm in place, Patient at risk for falls  Restraints  Restraints Initially in Place: No     Restrictions  Restrictions/Precautions  Restrictions/Precautions: Fall Risk  Required Braces or Orthoses?: Yes  Required Braces or Orthoses  Spinal: Lumbar Corset (LSO from previous admission d/t L1/2 fx)  Position Activity Restriction  Other position/activity restrictions: up with assist     Subjective   General  Chart Reviewed: No  Patient assessed for rehabilitation services?: Yes  Response To Previous Treatment: Not applicable  Family / Caregiver Present: No  Follows Commands: Within Functional Limits  Other (Comment): LSO on upon arrival and exit.  General Comment  Comments: RN and pt agreeable to PT. pt alert in bed upon arrival.  Subjective  Subjective: Pt denies pain, n/t.         Cognition   Orientation  Overall Orientation Status: Impaired  Orientation Level: Oriented to person;Disoriented to time;Disoriented to place;Disoriented to situation  Cognition  Overall Cognitive Status: Exceptions  Arousal/Alertness: Appears intact  Following Commands: Follows one step commands with repetition;Follows one step commands with increased time  Attention Span: Attends with cues to redirect  Safety Judgement: Good awareness of safety precautions;Decreased awareness of need for assistance  Problem Solving: Assistance required to identify errors made;Assistance required to generate solutions;Assistance required to correct errors made  Insights: Decreased awareness of deficits  Initiation: Requires cues for some  Sequencing: Requires cues for some      Objective   Bed mobility  Supine to Sit: Maximum assistance  Sit to Supine: Maximum assistance  Bed Mobility Comments: HOB elevated ~20 degrees. Pt required assistance in trunk and BLE for log rolling.  Transfers  Sit to Stand: Minimal Assistance  Stand to Sit: Minimal Assistance  Comment: Performed 2x from bed w/ RW, 1x from toilet w/ RW.  Ambulation  Surface: Level tile  Device: Rolling Walker  Assistance: Contact guard assistance  Quality of Gait: Pt amb w/ greatly reduced gait speed, symmetric step length, decreased step length TYRONE, mild anterior trunk lean, no LOB.  Distance: 40' + 40'  Comments: Further ambulation distance limited by fatigue, L hip pain.     Balance  Posture: Fair  Sitting - Static: Good;-  Sitting - Dynamic: Fair;+  Standing - Static: Fair;+  Standing - Dynamic: Fair  Comments: Standing balance assessed with RW         [facility] - Mobility  AM-PAC Basic Mobility - Inpatient   How much help is needed turning from your back to your side while in a flat bed without using bedrails?: A Lot  How much help is needed moving from lying on your back to sitting on the side of a flat bed without using bedrails?: A Lot  How much help is needed moving to and from a bed to a chair?: A Little  How much help is needed standing up from a chair using your arms?: A Lot  How much help is needed walking in hospital room?: A Little  How much help is needed climbing 3-5 steps with a railing?: A Lot  AM-PAC Inpatient Mobility Raw Score : 14  AM-PAC Inpatient T-Scale Score : 38.1  Mobility Inpatient CMS 0-100% Score: 61.29  Mobility Inpatient CMS G-Code Modifier : CL       Goals  Short Term Goals  Time Frame for Short Term Goals: 14 visits  Short Term Goal 1: Complete transfers with RW and mod I  Short Term Goal 2: Complete 300 ft of gait with RW and mod I  Short Term Goal 3: Complete 3 steps with one handrail and mod I  Short Term Goal 4: Participate in 30 minutes of therapy to promote endurance

## 2024-07-29 NOTE — PROGRESS NOTES
[facility]  Office: [phone]  Mio Hauser DO, Damien Cisneros, DO, Jarred Guzman DO, Te Clinton, DO, Luther Kulkarni MD, Yaneth Sawant MD, Lindsey James MD, Farideh Encarnacion MD,  Luke Shirley MD, Zev Mccall MD, Edenilson Chua MD,  Emma Kincaid DO, Brenda Colon MD, Ben Singer MD, Mat Hauser DO, Catie Plata MD,  Con Cordoba DO, Jazzy Thomas MD, Dahlia Damian MD, Chapis Hernández MD, James Silveira MD,  Kevyn Su MD, Ani Caldwell MD, Usman Pierson MD, Kurt Hammer MD, Franki Aaron MD, Azalia Holman MD, Mesfin Gagnon DO, Win Nolasco DO, Janusz Morales MD,  Chadwick Enriquez MD, Shirley Waterhouse, CNP,  Terrie Palacios CNP, Mt Mars, CNP,  Marcelina Sandoval, DNP, Nataliya Ashford, CNP, Claribel Green, CNP, Holli Alexander, CNP, Kacy Mayo, CNP, Deena Stanton, PA-C, Michell Mireles, PA-C, Kassandra Rajput, CNP, Margy Simons, CNP, Hayley Johns, CNP, Virginia Godfrey, CNP, Honey Page, CNP, Mariangel Forbes, CNS, Lucie Castle, CNP, Radha yWnn CNP, Tracy Schwab, CNP         [facility]   IN-PATIENT SERVICE   [facility]    Progress Note    7/29/2024    9:41 AM    Name:   Ana Domingo  MRN:     [id number]     Acct:      [id number]   Room:   OBS 18/18-1  IP Day:  0  Admit Date:  7/26/2024  4:02 PM    PCP:   Destiny Sauceda MD  Code Status:  DNR-CCA    Subjective:     C/C:   Chief Complaint   Patient presents with    Fall     Found down fall @ facility no thinners DNR CC baseline L1-2 Fxs     Interval History Status: not changed.     No issues overnight  was resting and sleeping at time of evaluation  Discussed with nursing  Seen by palliative care and Hospice  Code status changed to DNR-CC  Waiting on SNF placement  Patient doesn't meet inpatient Hospice criteria    Brief History:   Per documentation    Ana Domingo is a 78 y.o.  female with DM 2, HTN,HLP cognitive impairment, history of recurrent falls status post  CULTURE NO SIGNIFICANT GROWTH 07/14/2024 01:57 AM       Radiology:  CT CHEST ABDOMEN PELVIS W CONTRAST Additional Contrast? None    Result Date: 7/26/2024  1. Nondisplaced fracture of the lateral left 9th rib is noted with associated sclerosis, suggesting a subacute process. There is also an old anterior right 5th rib fracture with callus formation. Incomplete ossification between the anterolateral right 7th and 8th ribs is also likely related to old trauma. 2. No acute soft tissue traumatic injury identified in the chest, abdomen or pelvis. 3. Redemonstration of previously described L2 compression fracture with retropulsion and nondisplaced L1 inferior endplate fracture. 4. No acute osseous abnormality identified in the thoracic spine.     CT THORACIC SPINE BONY RECONSTRUCTION    Result Date: 7/26/2024  1. Nondisplaced fracture of the lateral left 9th rib is noted with associated sclerosis, suggesting a subacute process. There is also an old anterior right 5th rib fracture with callus formation. Incomplete ossification between the anterolateral right 7th and 8th ribs is also likely related to old trauma. 2. No acute soft tissue traumatic injury identified in the chest, abdomen or pelvis. 3. Redemonstration of previously described L2 compression fracture with retropulsion and nondisplaced L1 inferior endplate fracture. 4. No acute osseous abnormality identified in the thoracic spine.     CT LUMBAR SPINE BONY RECONSTRUCTION    Result Date: 7/26/2024  1. Nondisplaced fracture of the lateral left 9th rib is noted with associated sclerosis, suggesting a subacute process. There is also an old anterior right 5th rib fracture with callus formation. Incomplete ossification between the anterolateral right 7th and 8th ribs is also likely related to old trauma. 2. No acute soft tissue traumatic injury identified in the chest, abdomen or pelvis. 3. Redemonstration of previously described L2 compression fracture with

## 2024-07-29 NOTE — CARE COORDINATION
Spoke with Chaparro from [facility], pt does not meet inpt criteria but will put on hospice services when she returns to a facility. Family plans for [facility] ghada Rienzimuna barr left at [facility] requesting cb

## 2024-07-29 NOTE — ACP (ADVANCE CARE PLANNING)
Advance Care Planning      Palliative Medicine Provider (MD/NP)  Advance Care Planning (ACP) Conversation      Date of Conversation: 07/29/24  The patient and/or authorized decision maker consented to a voluntary Advance Care Planning conversation.   Individuals present for the conversation:   Patient and Legal healthcare agent named below    Legal Healthcare Agent(s):    Primary Decision Maker: Nelida Cordova - Child - [phone]    Primary Decision Maker: Blanca Brcok - Child - [phone]    ACP documents available in EMR prior to discussion:  -Portable DNR    Primary Palliative Diagnosis(es):  Dementia    Conversation Summary:  Patient does not have healthcare power of .  Her 2 children are decision-makers and present.  Family in agreement for DNR CC with hospice care at nursing facility.  Form completed and copies provided to family.    Resuscitation Status:    Code Status: DNR-CC    Outcomes / Completed Documentation:  An explanation of advance directives and their importance was provided and the following forms completed:    -Portable DNR    If new document completed, original was provided to patient and/or family member.    Copy was placed for scanning into the [facility] EMR.      I spent 10 minutes providing separately identifiable ACP services with the patient and/or surrogate decision maker in a voluntary, in-person conversation discussing the patient's wishes and goals as detailed in the above note.       Veena Norris, APRN - CNP

## 2024-07-29 NOTE — CONSULTS
Palliative Care Inpatient Consult    NAME:  Ana Domingo  MEDICAL RECORD NUMBER:  [id number]  AGE: 78 y.o.   GENDER: female  : 1945  TODAY'S DATE:  2024    Reasons for Consultation:    Symptom and/or pain management  Provision of information regarding PC and/or hospice philosophies  Complex, time-intensive communication and interdisciplinary psychosocial support  Clarification of goals of care and/or assistance with difficult decision-making  Guidance in regards to resources and transition(s)    Members of PC team contributing to this consultation are: BEAU Uribe    Plan      Palliative Interaction:    Palliative team met with patient and her two daughters Nelida and Blanca at the bedside.  After introductions, explained the role of palliative care and her team.  Family met with hospice RN this morning.    Patient was alert when I entered the room.  She fell asleep while her daughters were talking.  Her daughters discussed how patient has been doing prior to current admission.  They tell me that she was at home previously and they were trying to get care in place to assist her.  While in the process of this, patient had a fall and was admitted to [facility] earlier this month.  She was discharged to [facility].  Due to recurrent falls and severe agitation, she was brought back to the hospital.    Her daughters tell me how difficult it has been to see her extremely agitated.  They tell me that her pain was very uncontrolled in her back and she was being aggressive towards staff and \"climbing up the walls.\"  They tell me that today is the best that she has been with her mentation.  We discussed current medications that she is receiving in the hospital including lidocaine patch, Tylenol, and Namenda.  Nelida states that due to her severe agitation, she spoke with the facility psychiatrist who recommended Namenda last week.    Amy with hospice entered and updated family.  Patient does

## 2024-07-30 LAB
EKG ATRIAL RATE: 68 BPM
EKG P AXIS: 23 DEGREES
EKG P-R INTERVAL: 162 MS
EKG Q-T INTERVAL: 420 MS
EKG QRS DURATION: 96 MS
EKG QTC CALCULATION (BAZETT): 446 MS
EKG R AXIS: 35 DEGREES
EKG T AXIS: 69 DEGREES
EKG VENTRICULAR RATE: 68 BPM
GLUCOSE BLD-MCNC: 100 MG/DL (ref 65–105)
GLUCOSE BLD-MCNC: 117 MG/DL (ref 65–105)
GLUCOSE BLD-MCNC: 122 MG/DL (ref 65–105)
GLUCOSE BLD-MCNC: 123 MG/DL (ref 65–105)
GLUCOSE BLD-MCNC: 138 MG/DL (ref 65–105)

## 2024-07-30 PROCEDURE — 6360000002 HC RX W HCPCS: Performed by: INTERNAL MEDICINE

## 2024-07-30 PROCEDURE — 2580000003 HC RX 258: Performed by: INTERNAL MEDICINE

## 2024-07-30 PROCEDURE — 6370000000 HC RX 637 (ALT 250 FOR IP): Performed by: INTERNAL MEDICINE

## 2024-07-30 PROCEDURE — 82947 ASSAY GLUCOSE BLOOD QUANT: CPT

## 2024-07-30 PROCEDURE — 1200000000 HC SEMI PRIVATE

## 2024-07-30 PROCEDURE — 6370000000 HC RX 637 (ALT 250 FOR IP): Performed by: STUDENT IN AN ORGANIZED HEALTH CARE EDUCATION/TRAINING PROGRAM

## 2024-07-30 PROCEDURE — 93010 ELECTROCARDIOGRAM REPORT: CPT | Performed by: INTERNAL MEDICINE

## 2024-07-30 PROCEDURE — 99232 SBSQ HOSP IP/OBS MODERATE 35: CPT | Performed by: STUDENT IN AN ORGANIZED HEALTH CARE EDUCATION/TRAINING PROGRAM

## 2024-07-30 PROCEDURE — 99231 SBSQ HOSP IP/OBS SF/LOW 25: CPT | Performed by: NURSE PRACTITIONER

## 2024-07-30 RX ADMIN — CHOLECALCIFEROL TAB 25 MCG (1000 UNIT) 5000 UNITS: 25 TAB at 08:28

## 2024-07-30 RX ADMIN — DOCUSATE SODIUM 100 MG: 100 CAPSULE, LIQUID FILLED ORAL at 08:26

## 2024-07-30 RX ADMIN — SODIUM CHLORIDE, PRESERVATIVE FREE 10 ML: 5 INJECTION INTRAVENOUS at 19:30

## 2024-07-30 RX ADMIN — CARVEDILOL 12.5 MG: 12.5 TABLET, FILM COATED ORAL at 08:26

## 2024-07-30 RX ADMIN — SODIUM CHLORIDE, PRESERVATIVE FREE 10 ML: 5 INJECTION INTRAVENOUS at 10:05

## 2024-07-30 RX ADMIN — MEMANTINE HYDROCHLORIDE 5 MG: 5 TABLET, FILM COATED ORAL at 12:21

## 2024-07-30 RX ADMIN — Medication 10 MG: at 19:30

## 2024-07-30 RX ADMIN — HALOPERIDOL LACTATE 2 MG: 5 INJECTION, SOLUTION INTRAMUSCULAR at 12:00

## 2024-07-30 RX ADMIN — HALOPERIDOL LACTATE 2 MG: 5 INJECTION, SOLUTION INTRAMUSCULAR at 03:19

## 2024-07-30 RX ADMIN — IBUPROFEN 800 MG: 800 TABLET, FILM COATED ORAL at 11:56

## 2024-07-30 RX ADMIN — ACETAMINOPHEN 500 MG: 500 TABLET ORAL at 08:34

## 2024-07-30 RX ADMIN — ATORVASTATIN CALCIUM 80 MG: 80 TABLET, FILM COATED ORAL at 08:26

## 2024-07-30 RX ADMIN — Medication 1200 MG: at 19:30

## 2024-07-30 RX ADMIN — ENOXAPARIN SODIUM 40 MG: 100 INJECTION SUBCUTANEOUS at 08:37

## 2024-07-30 RX ADMIN — ACETAMINOPHEN 1000 MG: 500 TABLET ORAL at 14:17

## 2024-07-30 RX ADMIN — LISINOPRIL 40 MG: 20 TABLET ORAL at 08:25

## 2024-07-30 RX ADMIN — CARVEDILOL 12.5 MG: 12.5 TABLET, FILM COATED ORAL at 19:30

## 2024-07-30 RX ADMIN — Medication 1200 MG: at 08:29

## 2024-07-30 RX ADMIN — THERA TABS 1 TABLET: TAB at 08:31

## 2024-07-30 RX ADMIN — MONTELUKAST SODIUM 10 MG: 10 TABLET, FILM COATED ORAL at 19:30

## 2024-07-30 RX ADMIN — FLUOXETINE HYDROCHLORIDE 40 MG: 20 CAPSULE ORAL at 08:27

## 2024-07-30 ASSESSMENT — PAIN SCALES - GENERAL
PAINLEVEL_OUTOF10: 5
PAINLEVEL_OUTOF10: 3

## 2024-07-30 ASSESSMENT — PAIN DESCRIPTION - LOCATION: LOCATION: BACK

## 2024-07-30 NOTE — PROGRESS NOTES
HTN,HLP cognitive impairment, history of recurrent falls status post recent admission with lumbar burst fracture L2, fragility fracture L1,  discharged from trauma service 07/16 2 SNF for rehab.  Family describes 3 additional falls since being at rehab with increasing agitation, confusion    admitted for further management    Review of Systems:     Unable to obtain due to dementia     Medications:     Allergies:    Allergies   Allergen Reactions    Bupropion Hallucinations     See's flashing light    Doxycycline Nausea Only    Farxiga [Dapagliflozin] Diarrhea    Levofloxacin Other (See Comments)     dizzy    Mobic [Meloxicam] Swelling    Morphine Nausea Only    Sulfa Antibiotics Hives       Current Meds:   Scheduled Meds:    lisinopril  40 mg Oral Daily    sodium chloride flush  5-40 mL IntraVENous 2 times per day    enoxaparin  40 mg SubCUTAneous Daily    acetaminophen  1,000 mg Oral 3 times per day    aspirin  325 mg Oral Daily    atorvastatin  80 mg Oral Daily    carvedilol  12.5 mg Oral BID    docusate sodium  100 mg Oral Daily    melatonin  10 mg Oral Nightly    montelukast  10 mg Oral Nightly    multivitamin  1 tablet Oral Daily    FLUoxetine  40 mg Oral Daily    Vitamin D  5,000 Units Oral Daily    calcium carbonate  1,200 mg Oral BID    memantine  5 mg Oral Daily    lidocaine  1 patch TransDERmal Daily    insulin lispro  0-4 Units SubCUTAneous TID WC    insulin lispro  0-4 Units SubCUTAneous Nightly     Continuous Infusions:    sodium chloride 20 mL/hr at 07/28/24 1520    dextrose       PRN Meds: sodium chloride flush, sodium chloride, potassium chloride **OR** potassium alternative oral replacement **OR** potassium chloride, magnesium sulfate, ondansetron **OR** ondansetron, polyethylene glycol, acetaminophen, ibuprofen, haloperidol lactate, OLANZapine (ZyPREXA) 2.5 mg in sterile water 0.5 mL injection, glucose, dextrose bolus **OR** dextrose bolus, glucagon (rDNA), dextrose    Data:     Past Medical  History:   has a past medical history of Anxiety, Cellulitis, Depression, Diabetes mellitus (HCC), History of blood transfusion, Hyperlipidemia, Hypertension, and Myocardial infarct (HCC).    Social History:   reports that she has quit smoking. She has never used smokeless tobacco. She reports current alcohol use. She reports that she does not use drugs.     Family History: No family history on file.    Vitals:  /63   Pulse 62   Temp 98.8 °F (37.1 °C) (Oral)   Resp 19   Ht 1.651 m (5' 5\")   Wt 81.6 kg (180 lb)   SpO2 93%   BMI 29.95 kg/m²   Temp (24hrs), Av.4 °F (36.9 °C), Min:97.9 °F (36.6 °C), Max:98.8 °F (37.1 °C)    Recent Labs     24  12024  0756   POCGLU 155* 183* 168* 117*         I/O (24Hr):  No intake or output data in the 24 hours ending 24 0841      Labs:  Hematology:  Recent Labs     24  0602   WBC 8.4   RBC 3.66*   HGB 8.6*   HCT 29.1*   MCV 79.5*   MCH 23.5*   MCHC 29.6   RDW 13.9      MPV 10.5   INR 1.1       Chemistry:  Recent Labs     24  0602 24  1628     --    K 2.4* 3.8     --    CO2 24  --    GLUCOSE 40*  --    BUN 18  --    CREATININE 1.3*  --    MG 1.8  --    ANIONGAP 11  --    LABGLOM 44*  --    CALCIUM 9.5  --    PHOS 4.3  --        Recent Labs     24  0800 24  1202 24  0756   POCGLU 110* 187* 155* 183* 168* 117*       ABG:  Lab Results   Component Value Date/Time    FIO2 INFORMATION NOT PROVIDED 2023 07:53 PM     Lab Results   Component Value Date/Time    SPECIAL NOT REPORTED 09/10/2019 01:51 PM    SPECIAL NOT REPORTED 09/10/2019 01:51 PM    SPECIAL NOT REPORTED 09/10/2019 01:51 PM     Lab Results   Component Value Date/Time    CULTURE NO SIGNIFICANT GROWTH 2024 01:57 AM       Radiology:  CT CHEST ABDOMEN PELVIS W CONTRAST Additional Contrast? None    Result Date: 2024  1. Nondisplaced fracture of the lateral

## 2024-07-30 NOTE — CARE COORDINATION
Case Management Assessment  Initial Evaluation    Date/Time of Evaluation: 7/30/2024 3:30 PM  Assessment Completed by: Mary Vargas    If patient is discharged prior to next notation, then this note serves as note for discharge by case management.    Patient Name: Ana Domingo                   YOB: 1945  Diagnosis: Recurrent falls [R29.6]  Fall, initial encounter [W19.XXXA]                   Date / Time: 7/26/2024  4:02 PM    Patient Admission Status: Inpatient   Readmission Risk (Low < 19, Mod (19-27), High > 27): Readmission Risk Score: 18.6    Current PCP: Destiny Sauceda MD  PCP verified by CM? Yes    Chart Reviewed: Yes      History Provided by: Child/Family (Nelida Cordova)  Patient Orientation: Other (see comment) (Per notes, dementia)    Patient Cognition: Other (see comment) (Per notes, dementia)    Hospitalization in the last 30 days (Readmission):      If yes, Readmission Assessment in CM Navigator will be completed.    Advance Directives:      Code Status: DNR-CC   Patient's Primary Decision Maker is: Named in Scanned ACP Document    Primary Decision Maker: CordovaNelida - Child - [phone]    Primary Decision Maker: Blanca Brock - Child - [phone]    Discharge Planning:    Patient lives with: Alone Type of Home: Long-Term Care  Primary Care Giver: Other (Comment) (Per Nelida, lives alone, unable to care for self)  Patient Support Systems include: Children   Current Financial resources: Medicare, Medicaid  Current community resources: None  Current services prior to admission: Durable Medical Equipment            Current DME: Cane, Shower Chair            Type of Home Care services:  None    ADLS  Prior functional level: Other (see comment) (Per Nelida, lives alone, unable to care for self)  Current functional level: Other (see comment) (Per Nelida lives alone, unable to care for self)    PT AM-PAC: 14 /24  OT AM-PAC: 17 /24    Family can provide assistance at DC: No  Would you

## 2024-07-30 NOTE — PROGRESS NOTES
[facility]  Occupational Therapy Not Seen Note    DATE: 2024    NAME: Ana Domingo  MRN: [id number]   : 1945      Patient not seen this date for Occupational Therapy due to:    Patient Declined: Pt adamently declines participation in therapy this date and agitated over admission. RN notified. OT will check back tomorrow as able.    Next Scheduled Treatment:       Electronically signed by ARNAUD Fernandez on 2024 at 3:45 PM

## 2024-07-30 NOTE — PROGRESS NOTES
PALLIATIVE CARE PROGRESS NOTE     NAME:  Ana Domingo  MEDICAL RECORD NUMBER:  [id number]  AGE: 78 y.o.   GENDER: female  : 1945  TODAY'S DATE:  2024  Room: OBS     Reason For Consult:  Goals of care evaluation  Distress management  Symptom Management  Guidance and support  Facilitate communications  Assistance in coordinating care  Recommendations for the above    Plan      Palliative Interaction:    I met with patient at the bedside this afternoon. No family present. Patient was alert and resting comfortably in bed.  Patient was pleasantly confused and interactive.  She tells me that her back is sore today.    Patient had sitter at the bedside.  She was unable to recall that she was in the hospital and that her 2 daughters were present yesterday.  Patient is looking forward to discharge home.  I explained to her that I spoke with her daughters and the plan is to get her to discharge and keep her comfortable.  She and her family are not interested in doing physical therapy.  The ultimate goal is having hospice following with comfort measures.    Patient denies any current needs.  Palliative to continue to follow.    IMPRESSION/ PLAN  Symptom management/pain control    We feel the patient's symptoms are being controlled. Their current regimen has been reviewed by myself and discussed with the staff. We recommend adjusting their medications as follows:    Goals of care evaluation  The patient goals of care are provide comfort care/support/palliation/relieve suffering and achievement of a peaceful death  Long discussion to ensure the patient's and family's understanding of goals of care, and theconcept of palliative care.    Code Status:  DNR-CC    Other Recommendations -       History of Present Illness     HISTORY OF PRESENT ILLNESS:   The patient is a 78 y.o. female with a history of diabetes, hypertension, recurrent falls, dementia, hyperlipidemia, MI. Patient recently admitted to [facility]  of  intravenous contrast. Multiplanar reformatted images are provided for review.  Automated exposure control, iterative reconstruction, and/or weight based  adjustment of the mA/kV was utilized to reduce the radiation dose to as low  as reasonably achievable.; CT of the lumbar spine was performed without the  administration of intravenous contrast. Multiplanar reformatted images are  provided for review.  Adjustment of mA and/or kV according to patient size  was utilized.  Automated exposure control, iterative reconstruction, and/or  weight based adjustment of the mA/kV was utilized to reduce the radiation  dose to as low as reasonably achievable.; CT of the chest, abdomen and pelvis  was performed with the administration of intravenous contrast. Multiplanar  reformatted images are provided for review. Automated exposure control,  iterative reconstruction, and/or weight based adjustment of the mA/kV was  utilized to reduce the radiation dose to as low as reasonably achievable.    COMPARISON:  CT lumbar spine 07/13/2024.    HISTORY:  ORDERING SYSTEM PROVIDED HISTORY: fall  TECHNOLOGIST PROVIDED HISTORY:  fall; ORDERING SYSTEM PROVIDED HISTORY: fall  TECHNOLOGIST PROVIDED HISTORY:    fall  Decision Support Exception - unselect if not a suspected or confirmed  emergency medical condition->Emergency Medical Condition (MA)    CHEST:  Mediastinum: No acute aortic abnormality identified. No mediastinal hematoma.  No pericardial effusion.    Lungs/pleura: No acute airspace disease, pneumothorax or effusion.    Soft Tissues/Bones: Nondisplaced fracture of the lateral left 9th rib is  noted with associated sclerosis, suggesting a subacute process.  There is  also an old anterior right 5th rib fracture with callus formation.  Incomplete ossification between the anterolateral right 7th, and 8th ribs is  also likely related to old trauma.  No soft tissue hematoma identified.      Abdomen/Pelvis:    Organs: No solid organ injury

## 2024-07-30 NOTE — CARE COORDINATION
Transitional plan  Patient with a sitter, dementia.  No family at bedside.  Call placed to Nelida Salazar at [phone].  Initial assessment completed.

## 2024-07-30 NOTE — CARE COORDINATION
07/30/24 1619   Readmission Assessment   Number of Days since last admission? 8-30 days   Previous Disposition SNF   Who is being Interviewed   (Nelida Cordova, daughter)   What was the patient's/caregiver's perception as to why they think they needed to return back to the hospital? Other (Comment)  (fall)   Did you visit your Primary Care Physician after you left the hospital, before you returned this time? No   Why weren't you able to visit your PCP? Did not have an appointment   Did you see a specialist, such as Cardiac, Pulmonary, Orthopedic Physician, etc. after you left the hospital? No   Who advised the patient to return to the hospital? Skilled Unit   Does the patient report anything that got in the way of taking their medications? No   In our efforts to provide the best possible care to you and others like you, can you think of anything that we could have done to help you after you left the hospital the first time, so that you might not have needed to return so soon? Other (Comment)  (nothing)

## 2024-07-31 LAB
GLUCOSE BLD-MCNC: 109 MG/DL (ref 65–105)
GLUCOSE BLD-MCNC: 157 MG/DL (ref 65–105)
GLUCOSE BLD-MCNC: 176 MG/DL (ref 65–105)
GLUCOSE BLD-MCNC: 232 MG/DL (ref 65–105)

## 2024-07-31 PROCEDURE — 82947 ASSAY GLUCOSE BLOOD QUANT: CPT

## 2024-07-31 PROCEDURE — 1200000000 HC SEMI PRIVATE

## 2024-07-31 PROCEDURE — 6370000000 HC RX 637 (ALT 250 FOR IP): Performed by: STUDENT IN AN ORGANIZED HEALTH CARE EDUCATION/TRAINING PROGRAM

## 2024-07-31 PROCEDURE — 6360000002 HC RX W HCPCS: Performed by: INTERNAL MEDICINE

## 2024-07-31 PROCEDURE — 2580000003 HC RX 258: Performed by: INTERNAL MEDICINE

## 2024-07-31 PROCEDURE — 99233 SBSQ HOSP IP/OBS HIGH 50: CPT | Performed by: INTERNAL MEDICINE

## 2024-07-31 PROCEDURE — 6370000000 HC RX 637 (ALT 250 FOR IP): Performed by: INTERNAL MEDICINE

## 2024-07-31 RX ADMIN — CARVEDILOL 12.5 MG: 12.5 TABLET, FILM COATED ORAL at 08:16

## 2024-07-31 RX ADMIN — HALOPERIDOL LACTATE 2 MG: 5 INJECTION, SOLUTION INTRAMUSCULAR at 14:38

## 2024-07-31 RX ADMIN — Medication 1200 MG: at 20:38

## 2024-07-31 RX ADMIN — ACETAMINOPHEN 1000 MG: 500 TABLET ORAL at 06:42

## 2024-07-31 RX ADMIN — INSULIN LISPRO 2 UNITS: 100 INJECTION, SOLUTION INTRAVENOUS; SUBCUTANEOUS at 11:55

## 2024-07-31 RX ADMIN — SODIUM CHLORIDE, PRESERVATIVE FREE 10 ML: 5 INJECTION INTRAVENOUS at 20:40

## 2024-07-31 RX ADMIN — ONDANSETRON 4 MG: 4 TABLET, ORALLY DISINTEGRATING ORAL at 13:12

## 2024-07-31 RX ADMIN — LISINOPRIL 40 MG: 20 TABLET ORAL at 08:16

## 2024-07-31 RX ADMIN — MEMANTINE HYDROCHLORIDE 5 MG: 5 TABLET, FILM COATED ORAL at 08:17

## 2024-07-31 RX ADMIN — FLUOXETINE HYDROCHLORIDE 40 MG: 20 CAPSULE ORAL at 08:17

## 2024-07-31 RX ADMIN — MONTELUKAST SODIUM 10 MG: 10 TABLET, FILM COATED ORAL at 20:38

## 2024-07-31 RX ADMIN — ENOXAPARIN SODIUM 40 MG: 100 INJECTION SUBCUTANEOUS at 08:17

## 2024-07-31 RX ADMIN — Medication 1200 MG: at 08:16

## 2024-07-31 RX ADMIN — ACETAMINOPHEN 1000 MG: 500 TABLET ORAL at 20:38

## 2024-07-31 RX ADMIN — ATORVASTATIN CALCIUM 80 MG: 80 TABLET, FILM COATED ORAL at 08:16

## 2024-07-31 RX ADMIN — CHOLECALCIFEROL TAB 25 MCG (1000 UNIT) 5000 UNITS: 25 TAB at 08:16

## 2024-07-31 RX ADMIN — SODIUM CHLORIDE, PRESERVATIVE FREE 10 ML: 5 INJECTION INTRAVENOUS at 08:30

## 2024-07-31 RX ADMIN — ACETAMINOPHEN 1000 MG: 500 TABLET ORAL at 13:10

## 2024-07-31 RX ADMIN — CARVEDILOL 12.5 MG: 12.5 TABLET, FILM COATED ORAL at 20:38

## 2024-07-31 RX ADMIN — DOCUSATE SODIUM 100 MG: 100 CAPSULE, LIQUID FILLED ORAL at 08:16

## 2024-07-31 RX ADMIN — ASPIRIN 325 MG: 325 TABLET, FILM COATED ORAL at 08:17

## 2024-07-31 RX ADMIN — THERA TABS 1 TABLET: TAB at 08:17

## 2024-07-31 RX ADMIN — Medication 10 MG: at 20:40

## 2024-07-31 RX ADMIN — HALOPERIDOL LACTATE 2 MG: 5 INJECTION, SOLUTION INTRAMUSCULAR at 20:35

## 2024-07-31 ASSESSMENT — PAIN DESCRIPTION - DESCRIPTORS: DESCRIPTORS: STABBING;SORE

## 2024-07-31 ASSESSMENT — PAIN SCALES - GENERAL
PAINLEVEL_OUTOF10: 0
PAINLEVEL_OUTOF10: 8
PAINLEVEL_OUTOF10: 7
PAINLEVEL_OUTOF10: 8

## 2024-07-31 ASSESSMENT — PAIN SCALES - WONG BAKER: WONGBAKER_NUMERICALRESPONSE: NO HURT

## 2024-07-31 ASSESSMENT — PAIN DESCRIPTION - ORIENTATION: ORIENTATION: LOWER

## 2024-07-31 ASSESSMENT — PAIN DESCRIPTION - LOCATION
LOCATION: BACK
LOCATION: BACK
LOCATION: BACK;BUTTOCKS

## 2024-07-31 NOTE — PROGRESS NOTES
[facility]  Office: [phone]  Mio Hauser DO, Damien Cisneros, DO, Jarred Guzman DO, Te Clinton, DO, Luther Kulkarni MD, Yaneth Sawant MD, Lindsey James MD, Farideh Encarnacion MD,  Luke Shirley MD, Zev Mccall MD, Edenilson Chua MD,  Emma Kincaid DO, Brenda Colon MD, Ben Singer MD, Mat Hauser DO, Catie Plata MD,  Con Cordoba DO, Jazzy Thomas MD, Dahlia Damian MD, Chapis Hernández MD, James Silveira MD,  Kevyn Su MD, Ani Caldwell MD, Usman Pierson MD, Kurt Hammer MD, Franki Aaron MD, Azalia Holman MD, Mesfin Gagnon DO, Win Nolasco DO, Janusz Morales MD,  Chadwick Enriquez MD, Shirley Waterhouse, CNP,  Terrie Palacios CNP, Mt [place], CNP,  Marcelina Sandoval, DNP, Nataliya Ashford, CNP, Claribel Green, CNP, Holli Alexander, CNP, Kacy Mayo, CNP, Deena Stanton, PA-C, Michell Mireles, PA-C, Kassandra Rajput, CNP, Margy Simons, CNP, Hayley Johns, CNP, Virginia Godfrey, CNP, Honey Page, CNP, Mariangel Forbes, CNS, Lucie Castle, CNP, Radha Wynn CNP, Tracy Schwab, CNP         [facility]   IN-PATIENT SERVICE   [facility]    Progress Note    7/31/2024    3:33 PM    Name:   Ana Domingo  MRN:     [id number]     Acct:      [id number]   Room:   OBS 18/18-1  IP Day:  2  Admit Date:  7/26/2024  4:02 PM    PCP:   Destiny Sauceda MD  Code Status:  DNR-CC    Subjective:     C/C:   Chief Complaint   Patient presents with    Fall     Found down fall @ facility no thinners DNR CC baseline L1-2 Fxs     Interval History Status: not changed.     No issues overnight      Brief History:   Per documentation    Ana Domingo is a 78 y.o.  female with DM 2, HTN,HLP cognitive impairment, history of recurrent falls status post recent admission with lumbar burst fracture L2, fragility fracture L1,  discharged from trauma service 07/16 2 SNF for rehab.  Family describes 3 additional falls since being at rehab with increasing agitation,

## 2024-07-31 NOTE — CARE COORDINATION
Left Vm for admissions at [facility], I requested a return call    1420 spoke with Bibi at [facility], request referral to be manually faxed.  Spoke with patient and family, specifically daughter Nelida, who relates she thought patient was accepted by [facility].  Spoke with Cris at intake, she will check on referral status and call me back    1429 call back from Cris at [facility], they were under the impression that patient went to inpatient hospice and have closed out referral.  I have sent new referral    1435 left Vm for [place] with [facility] to see if she has a Medicaid number, patient's family stating she was recently approved    1450 CB from Cris at [facility], they cannot accept due to psych history    1528 left VM for admissions at [facility]    1552 spoke with patient's daughter Blanca at bedside, updated her to the above.  Provided her with a list of facilities with memory care units.  She will review with her sister and I have provided her with my phone number.  There is no current POA, patient has 2 daughters.  Medicaid case number is [id number]

## 2024-07-31 NOTE — PROGRESS NOTES
Patient up in chair complaining of nausea and dizziness at 1300; given sublingual Zofran at 1310; patient had emesis occurrence after Zofran administration; Writer and another RN got patient back into bed, PRN medication given; patient resting quietly with family

## 2024-07-31 NOTE — PLAN OF CARE
Problem: Chronic Conditions and Co-morbidities  Goal: Patient's chronic conditions and co-morbidity symptoms are monitored and maintained or improved  Outcome: Progressing     Problem: Discharge Planning  Goal: Discharge to home or other facility with appropriate resources  Outcome: Progressing     Problem: Safety - Adult  Goal: Free from fall injury  Outcome: Progressing     Problem: Pain  Goal: Verbalizes/displays adequate comfort level or baseline comfort level  Outcome: Progressing     Problem: Confusion  Goal: Confusion, delirium, dementia, or psychosis is improved or at baseline  Description: INTERVENTIONS:  1. Assess for possible contributors to thought disturbance, including medications, impaired vision or hearing, underlying metabolic abnormalities, dehydration, psychiatric diagnoses, and notify attending LIP  2. [place] high risk fall precautions, as indicated  3. Provide frequent short contacts to provide reality reorientation, refocusing and direction  4. Decrease environmental stimuli, including noise as appropriate  5. Monitor and intervene to maintain adequate nutrition, hydration, elimination, sleep and activity  6. If unable to ensure safety without constant attention obtain sitter and review sitter guidelines with assigned personnel  7. Initiate Psychosocial CNS and Spiritual Care consult, as indicated  Outcome: Progressing     Problem: Skin/Tissue Integrity  Goal: Absence of new skin breakdown  Description: 1.  Monitor for areas of redness and/or skin breakdown  2.  Assess vascular access sites hourly  3.  Every 4-6 hours minimum:  Change oxygen saturation probe site  4.  Every 4-6 hours:  If on nasal continuous positive airway pressure, respiratory therapy assess nares and determine need for appliance change or resting period.  Outcome: Progressing     Problem: Nutrition Deficit:  Goal: Optimize nutritional status  Outcome: Progressing     Problem: ABCDS Injury Assessment  Goal: Absence of

## 2024-08-01 VITALS
TEMPERATURE: 96.8 F | RESPIRATION RATE: 17 BRPM | HEART RATE: 60 BPM | OXYGEN SATURATION: 96 % | DIASTOLIC BLOOD PRESSURE: 72 MMHG | HEIGHT: 65 IN | BODY MASS INDEX: 29.99 KG/M2 | SYSTOLIC BLOOD PRESSURE: 148 MMHG | WEIGHT: 180 LBS

## 2024-08-01 LAB
ANION GAP SERPL CALCULATED.3IONS-SCNC: 10 MMOL/L (ref 9–16)
BUN SERPL-MCNC: 16 MG/DL (ref 8–23)
CALCIUM SERPL-MCNC: 9.4 MG/DL (ref 8.6–10.4)
CHLORIDE SERPL-SCNC: 103 MMOL/L (ref 98–107)
CO2 SERPL-SCNC: 27 MMOL/L (ref 20–31)
CREAT SERPL-MCNC: 0.9 MG/DL (ref 0.5–0.9)
ERYTHROCYTE [DISTWIDTH] IN BLOOD BY AUTOMATED COUNT: 13.8 % (ref 11.8–14.4)
GFR, ESTIMATED: 64 ML/MIN/1.73M2
GLUCOSE BLD-MCNC: 126 MG/DL (ref 65–105)
GLUCOSE BLD-MCNC: 137 MG/DL (ref 65–105)
GLUCOSE SERPL-MCNC: 118 MG/DL (ref 74–99)
HCT VFR BLD AUTO: 29.9 % (ref 36.3–47.1)
HGB BLD-MCNC: 9.5 G/DL (ref 11.9–15.1)
MCH RBC QN AUTO: 23.9 PG (ref 25.2–33.5)
MCHC RBC AUTO-ENTMCNC: 31.8 G/DL (ref 28.4–34.8)
MCV RBC AUTO: 75.3 FL (ref 82.6–102.9)
NRBC BLD-RTO: 0 PER 100 WBC
PLATELET # BLD AUTO: 261 K/UL (ref 138–453)
PMV BLD AUTO: 10 FL (ref 8.1–13.5)
POTASSIUM SERPL-SCNC: 3.7 MMOL/L (ref 3.7–5.3)
RBC # BLD AUTO: 3.97 M/UL (ref 3.95–5.11)
SODIUM SERPL-SCNC: 140 MMOL/L (ref 136–145)
WBC OTHER # BLD: 6.7 K/UL (ref 3.5–11.3)

## 2024-08-01 PROCEDURE — 82947 ASSAY GLUCOSE BLOOD QUANT: CPT

## 2024-08-01 PROCEDURE — 85027 COMPLETE CBC AUTOMATED: CPT

## 2024-08-01 PROCEDURE — 97110 THERAPEUTIC EXERCISES: CPT

## 2024-08-01 PROCEDURE — 97530 THERAPEUTIC ACTIVITIES: CPT

## 2024-08-01 PROCEDURE — 6370000000 HC RX 637 (ALT 250 FOR IP): Performed by: STUDENT IN AN ORGANIZED HEALTH CARE EDUCATION/TRAINING PROGRAM

## 2024-08-01 PROCEDURE — 6370000000 HC RX 637 (ALT 250 FOR IP): Performed by: INTERNAL MEDICINE

## 2024-08-01 PROCEDURE — 2580000003 HC RX 258: Performed by: INTERNAL MEDICINE

## 2024-08-01 PROCEDURE — 80048 BASIC METABOLIC PNL TOTAL CA: CPT

## 2024-08-01 PROCEDURE — 36415 COLL VENOUS BLD VENIPUNCTURE: CPT

## 2024-08-01 PROCEDURE — 99231 SBSQ HOSP IP/OBS SF/LOW 25: CPT | Performed by: STUDENT IN AN ORGANIZED HEALTH CARE EDUCATION/TRAINING PROGRAM

## 2024-08-01 PROCEDURE — 6360000002 HC RX W HCPCS: Performed by: INTERNAL MEDICINE

## 2024-08-01 RX ORDER — LISINOPRIL 40 MG/1
40 TABLET ORAL DAILY
Qty: 30 TABLET | Refills: 0
Start: 2024-08-01

## 2024-08-01 RX ADMIN — CHOLECALCIFEROL TAB 25 MCG (1000 UNIT) 5000 UNITS: 25 TAB at 08:56

## 2024-08-01 RX ADMIN — THERA TABS 1 TABLET: TAB at 08:56

## 2024-08-01 RX ADMIN — LISINOPRIL 40 MG: 20 TABLET ORAL at 08:56

## 2024-08-01 RX ADMIN — MEMANTINE HYDROCHLORIDE 5 MG: 5 TABLET, FILM COATED ORAL at 08:56

## 2024-08-01 RX ADMIN — DOCUSATE SODIUM 100 MG: 100 CAPSULE, LIQUID FILLED ORAL at 08:56

## 2024-08-01 RX ADMIN — ACETAMINOPHEN 1000 MG: 500 TABLET ORAL at 14:48

## 2024-08-01 RX ADMIN — ATORVASTATIN CALCIUM 80 MG: 80 TABLET, FILM COATED ORAL at 08:56

## 2024-08-01 RX ADMIN — FLUOXETINE HYDROCHLORIDE 40 MG: 20 CAPSULE ORAL at 08:56

## 2024-08-01 RX ADMIN — ASPIRIN 325 MG: 325 TABLET, FILM COATED ORAL at 08:56

## 2024-08-01 RX ADMIN — ACETAMINOPHEN 1000 MG: 500 TABLET ORAL at 06:42

## 2024-08-01 RX ADMIN — ENOXAPARIN SODIUM 40 MG: 100 INJECTION SUBCUTANEOUS at 08:55

## 2024-08-01 RX ADMIN — SODIUM CHLORIDE, PRESERVATIVE FREE 10 ML: 5 INJECTION INTRAVENOUS at 08:57

## 2024-08-01 RX ADMIN — CARVEDILOL 12.5 MG: 12.5 TABLET, FILM COATED ORAL at 08:56

## 2024-08-01 RX ADMIN — Medication 1200 MG: at 08:56

## 2024-08-01 RX ADMIN — ONDANSETRON 4 MG: 4 TABLET, ORALLY DISINTEGRATING ORAL at 14:49

## 2024-08-01 ASSESSMENT — PAIN SCALES - GENERAL
PAINLEVEL_OUTOF10: 10
PAINLEVEL_OUTOF10: 5

## 2024-08-01 ASSESSMENT — PAIN DESCRIPTION - LOCATION: LOCATION: BACK;HIP

## 2024-08-01 ASSESSMENT — PAIN DESCRIPTION - ORIENTATION: ORIENTATION: LEFT

## 2024-08-01 NOTE — CARE COORDINATION
Discharge Report    [facility]  Clinical Case Management Department  Written by: JARAD PEARSON RN    Patient Name: Ana Domingo  Attending Provider: Chapis Hernández MD  Admit Date: 2024  4:02 PM  MRN: [id number]  Account: [id number]                     : 1945  Discharge Date: 24  Call from [facility] with [facility] can accept today  Call report to   Faxed AVS to    Called daughter connie barragan , spoke to parker updated  Faxed transport request to [facility]  [phone] completed    Disposition: long term care facility    JARAD PEARSON RN

## 2024-08-01 NOTE — CARE COORDINATION
Oliver to transport @ 14:45   Called daughter connie updated  Will mail copy of imm to parker [phone] [facility] [id number]  Ps Dr sosa updated

## 2024-08-01 NOTE — CARE COORDINATION
Vm from marjean with help found medicaid # [id number]   Called sheila with [facility]

## 2024-08-01 NOTE — PROGRESS NOTES
Physical Therapy  Facility/Department: [facility] OBSERVATION UNIT  Physical Therapy Treatment Note    Name: Ana Domingo  : 1945  MRN: [id number]  Date of Service: 2024    Discharge Recommendations:  Patient would benefit from continued therapy after discharge   PT Equipment Recommendations  Equipment Needed: No      Patient Diagnosis(es): The encounter diagnosis was Fall, initial encounter.  Past Medical History:  has a past medical history of Anxiety, Cellulitis, Depression, Diabetes mellitus (HCC), History of blood transfusion, Hyperlipidemia, Hypertension, and Myocardial infarct (HCC).  Past Surgical History:  has a past surgical history that includes Cholecystectomy; Hysterectomy; Tonsillectomy; knee surgery (Left); Appendectomy; Vulva surgery (2017); Colonoscopy; Endoscopy, colon, diagnostic; other surgical history (Left, 09/10/2019); and Abdomen surgery (Left, 9/10/2019).    Assessment   Body Structures, Functions, Activity Limitations Requiring Skilled Therapeutic Intervention: Decreased functional mobility ;Decreased ADL status;Decreased body mechanics;Decreased strength;Decreased high-level IADLs;Decreased balance;Decreased endurance;Increased pain;Decreased coordination;Decreased safe awareness;Decreased cognition  Assessment: Pt amb 40' x 2 CGA RW, limited by fatigue, L hip pain. Pt requires maxA+1 in bed mobility in/out of bed. Pt is currently unsafe to return to prior living arrangements. Pt would benefit from continued acute PT to address deficits.  Therapy Prognosis: Good  Activity Tolerance  Activity Tolerance: Patient limited by fatigue;Patient limited by endurance;Patient limited by pain     Plan   Physical Therapy Plan  General Plan:  (5-6x)  Current Treatment Recommendations: Strengthening, Balance training, Functional mobility training, Transfer training, ADL/Self-care training, IADL training, Endurance training, Equipment evaluation, education, & procurement, Patient/Caregiver  Inpatient T-Scale Score : 38.1  Mobility Inpatient CMS 0-100% Score: 61.29  Mobility Inpatient CMS G-Code Modifier : CL    Goals  Short Term Goals  Time Frame for Short Term Goals: 14 visits  Short Term Goal 1: Complete transfers with RW and mod I  Short Term Goal 2: Complete 300 ft of gait with RW and mod I  Short Term Goal 3: Complete 3 steps with one handrail and mod I  Short Term Goal 4: Participate in 30 minutes of therapy to promote endurance     Education  Patient Education  Education Given To: Patient  Education Provided: Role of Therapy;Plan of Care  Education Method: Verbal  Barriers to Learning: Cognition  Education Outcome: Verbalized understanding;Continued education needed    Therapy Time   Individual Concurrent Group Co-treatment   Time In 1129         Time Out 1156         Minutes 27         Timed Code Treatment Minutes: 25 Minutes     Israel Rosario, PTA

## 2024-08-01 NOTE — PROGRESS NOTES
[facility]  Occupational Therapy Not Seen Note    DATE: 2024    NAME: Ana Domingo  MRN: [id number]   : 1945      Patient not seen this date for Occupational Therapy due to:    Patient Declined: Pt declines participation in therapy this date despite encouragement and several activities suggested. OT will continue as pt is agreeable.     Next Scheduled Treatment:       Electronically signed by ARNAUD Fernandez on 2024 at 10:40 AM

## 2024-08-01 NOTE — PROGRESS NOTES
[facility]  Office: [phone]  Mio Hauser DO, Damien Cisneros, DO, Jarred Guzman DO, Te Clinton, DO, Luther Kulkarni MD, Yaneth Sawant MD, Lindsey James MD, Farideh Encarnacion MD,  Luke Shirley MD, Zev Mccall MD, Edenilson Chua MD,  Emma Kincaid DO, Brenda Colon MD, Ben Singer MD, Mat Hauser DO, Catie Plata MD,  Con Cordoba DO, Jazzy Thomas MD, Dahlia Damian MD, Chapis Hernández MD, James Silveira MD,  Kevyn Su MD, Ani Caldwell MD, Usman Pierson MD, Kurt Hammer MD, Franki Aaron MD, Azalia Holman MD, Mesfin Gagnon DO, Win Nolasco DO, Janusz Morales MD,  Chadwick Enriquez MD, Shirley Waterhouse, CNP,  Terrie Palacios CNP, [place], CNP,  Marcelina Sandoval, DNP, Nataliya Ashford, CNP, Claribel Green, CNP, Holli Alexander, CNP, Kacy Mayo, CNP, Deena Stanton, PA-C, Michell Mireles, PA-C, Kassandra Rajput, CNP, Margy Simons, CNP, Hayley Johns, CNP, Virginia Godfrey, CNP, Honey Page, CNP, Mariangel Forbes, CNS, Lucie Castle, CNP, Radha Wynn CNP, Tracy Schwab, CNP         [facility]   IN-PATIENT SERVICE   [facility]    Progress Note    8/1/2024    8:17 AM    Name:   Ana Domingo  MRN:     [id number]     Acct:      [id number]   Room:   OBS 18/18-1  IP Day:  3  Admit Date:  7/26/2024  4:02 PM    PCP:   Destiny Sauceda MD  Code Status:  DNR-CC    Subjective:     C/C:   Chief Complaint   Patient presents with    Fall     Found down fall @ facility no thinners DNR CC baseline L1-2 Fxs     Interval History Status: not changed.     No issues overnight  Was resting at time of evaluation  Denies any pain  Seen by palliative care and Hospice   Code status changed to DNR-CC  Waiting on SNF placement  Patient doesn't meet inpatient Hospice criteria  Plan for discharge today to SNF  Brief History:   Per documentation    Ana Domingo is a 78 y.o.  female with DM 2, HTN,HLP cognitive impairment, history of recurrent falls  abnormality identified in the cervical spine.     CT CERVICAL SPINE WO CONTRAST    Result Date: 7/26/2024  Chronic findings in the brain without acute CT abnormality identified. No acute osseous abnormality identified in the cervical spine.       Physical Examination:        General appearance: alert, awake and not in acute distress  Was resting  Mental Status:  disoriented, baseline dementia  Lungs:  clear to auscultation bilaterally, normal effort  Heart:  regular rate and rhythm  Abdomen:  soft, nontender, nondistended        Assessment:        Hospital Problems             Last Modified POA    * (Principal) Dementia ([facility]) 7/27/2024 Yes    Hyperlipidemia 7/27/2024 Yes    Diabetes mellitus ([facility]) 7/27/2024 Yes    HTN (hypertension), benign 7/27/2024 Yes    Closed compression fracture of body of lumbar vertebra ([facility]) 7/27/2024 Yes    Recurrent falls 7/27/2024 Yes   Plan:        - Continue home medications for HTN, dementia  C/w lisinopril  40 mg  Monitor glucose, A1c 6.8   BGs controlled  C/w PT/OT   Haldol PRN for agitation  Hypokalemia resolved  Seen by palliative care and Hospice yesterday  Code status changed to DNR-CC  Patient doesn't meet inpatient Hospice criteria  Plan for discharge today to SNF  Discussed with case management      Chapis Hernández MD  8/1/2024  8:17 AM

## 2024-08-06 NOTE — DISCHARGE SUMMARY
management of   Dementia (HCC) , presented to ER with Fall (Found down fall @ facility no thinners DNR CC baseline L1-2 Fxs)   78 y.o.  female with DM 2, HTN,HLP cognitive impairment, history of recurrent falls status post recent admission with lumbar burst fracture L2, fragility fracture L1,  discharged from trauma service 07/16 2 SNF for rehab.  Family described 3 additional falls since being at rehab with increasing agitation, confusion admitted for further management  Patient had CT scan of head and cervical spine that showed no acute abnormality  CT of thoracic and lumbar and chest/abdomen spine showed nondisplaced fracture of lateral left ninth rib as well as old anterior right fifth fracture with callus formation as well as L2 compression fracture with retropulsion and nondisplaced L1 inferior endplate fracture.  Patient was managed conservatively with brace and continue with pain medication as needed  Patient was followed by PT OT.  Patient was seen by palliative care and hospice  CODE STATUS was changed to DNR CC, patient did not meet inpatient hospice criteria.  Plan is to continue hospice from SNF  Patient was discharged to SNF      Significant therapeutic interventions:     Significant Diagnostic Studies:   Labs / Micro:  CBC:   Lab Results   Component Value Date/Time    WBC 6.7 08/01/2024 12:41 PM    RBC 3.97 08/01/2024 12:41 PM    HGB 9.5 08/01/2024 12:41 PM    HCT 29.9 08/01/2024 12:41 PM    MCV 75.3 08/01/2024 12:41 PM    MCH 23.9 08/01/2024 12:41 PM    MCHC 31.8 08/01/2024 12:41 PM    RDW 13.8 08/01/2024 12:41 PM     08/01/2024 12:41 PM     BMP:    Lab Results   Component Value Date/Time    GLUCOSE 118 08/01/2024 12:41 PM     08/01/2024 12:41 PM    K 3.7 08/01/2024 12:41 PM     08/01/2024 12:41 PM    CO2 27 08/01/2024 12:41 PM    ANIONGAP 10 08/01/2024 12:41 PM    BUN 16 08/01/2024 12:41 PM    CREATININE 0.9 08/01/2024 12:41 PM    CALCIUM 9.4 08/01/2024 12:41 PM    LABGLOM 64

## 2024-08-07 ENCOUNTER — TELEPHONE (OUTPATIENT)
Age: 79
End: 2024-08-07

## 2024-08-07 NOTE — TELEPHONE ENCOUNTER
Faxed    Itraconazole Counseling:  I discussed with the patient the risks of itraconazole including but not limited to liver damage, nausea/vomiting, neuropathy, and severe allergy.  The patient understands that this medication is best absorbed when taken with acidic beverages such as non-diet cola or ginger ale.  The patient understands that monitoring is required including baseline LFTs and repeat LFTs at intervals.  The patient understands that they are to contact us or the primary physician if concerning signs are noted.

## (undated) DEVICE — GAUZE,SPONGE,FLUFF,6"X6.75",STRL,5/TRAY: Brand: MEDLINE

## (undated) DEVICE — GAUZE,PACKING STRIP,PLAIN,1"X5YD,STRL,LF: Brand: CURAD

## (undated) DEVICE — Device

## (undated) DEVICE — SKIN PREP TRAY W/CHG: Brand: MEDLINE INDUSTRIES, INC.

## (undated) DEVICE — SWAB CULT CLR BLU PLAS RAYON LIQ AMIES AERB ANAERB FRIC CAP

## (undated) DEVICE — GARMENT,MEDLINE,DVT,INT,CALF,MED, GEN2: Brand: MEDLINE

## (undated) DEVICE — GLOVE SURG SZ 75 L12IN FNGR THK87MIL WHT LTX FREE

## (undated) DEVICE — 3M™ WARMING BLANKET, UPPER BODY, 10 PER CASE, 42268: Brand: BAIR HUGGER™

## (undated) DEVICE — TUBING, SUCTION, 1/4" X 12', STRAIGHT: Brand: MEDLINE

## (undated) DEVICE — DRAPE,REIN 53X77,STERILE: Brand: MEDLINE

## (undated) DEVICE — SURGICAL PROCEDURE KIT BASIN MAJ SET UP

## (undated) DEVICE — DRESSING TRNSPAR W5XL4.5IN FLM SHT SEMIPERMEABLE WIND

## (undated) DEVICE — PAD,ABDOMINAL,5"X9",ST,LF,25/BX: Brand: MEDLINE INDUSTRIES, INC.

## (undated) DEVICE — YANKAUER,POOLE TIP,STERILE,50/CS: Brand: MEDLINE